# Patient Record
(demographics unavailable — no encounter records)

---

## 2024-10-24 NOTE — PHYSICAL EXAM
[FreeTextEntry1] : Mental status: Orientation: Alert , oriented to month, day of week, year, location Speech is fluent , able to name objects, repeat a sentence and write a sentence Memory: Short term tested by registering 3 objects and recalled 3/3 in 5 min; Long term memory intact based on correct recall of past events and demographic details. Concentration: able to do serial 7 calculation 5/5 and spell world backwards Comprehension : able follow 3 step requests Apraxia and visuospatial able to draw clock drawing and intersecting pentagon Neglect is absent Agnosia is absent  MMSE 30/30 Cranial nerves: CN I deferred. CN II VFF; ; III, IV, VI: PERRLA, EOM IV: Facial sensation normal B/L to touch, pinprick and temperature VII:Facial strength normal B/L. VIII: Gross hearing intact IX, X: palate midline and elevates symmetrically XI: Trapezius normal strength, XII: Tongue midline without atrophy or fasciculation Motor: Muscle tone no rigidity or resistance in all 4 extremities. No atrophy or fasciculation. Muscle strength: arms and legs, proximal and distal flexors and extensors are normal 5/5. No UE drift. Sensory: intact to pinprick, temperature sensation to vibration and cold.  Reflexes: all present, normal, and symmetrical 2+  Coordination: finger to nose: normal. Heel to shin: normal.  Mild impairment with right hand with repetitive finger movement  Gait: steady normal based, difficulty with tandem gait ; Romberg test negative

## 2024-10-24 NOTE — HISTORY OF PRESENT ILLNESS
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 Fung on ASA 81mg daily and Brilinta 60mg BID here for hospital follow-up. Initially presenting to [facility] on 10/5 for episode of imbalance and right sided weakness while in grocery store.  Reports started to feel her right side have "no control".  Symptoms resolved but sense of imbalance continued to come and go, she noticed later that day episode of slurring of speech which prompted her to come to the ER.   MRI brain was completed here with what the radiologist read as acute/subacute small anterior left temporal lobe infarction however both vascular neurologist and neurosurgeon reviewed MRI brain and suspected that finding was an old injury with T2 shine through.  In May 2024 after stent placed she became aphasic, CT head at that time revealing SAH within left sylvian fissure and indenting anterior aspect of left temporal lobe as well as ischemic infarct within the left temporal lobe.  During recent hospital stay EEG complete occasional left temporal slowing suggestive of focal dysfunction in this region.  Offered AED given cannot fully rule out seizure but pt refused.  Discharged home, while having dinner on same day with  experienced acute onset of R facial droop, R vision loss, and R arm/ leg weakness, and speech difficulty prompting return to hospital on 10/7.  Pt back to baseline upon arrival to hospital.  CT head without evidence of acute intracranial hemorrhage or large infarct. CTA with no large vessel occlusion, significant stenosis. Stable 3 mm residual at the base of the left ICA aneurysm, which is largely thrombosed. CTP without penumbra/core.  MRI showing left internal capsule new infarct compared to MRI on 10/5.  Increased ASA to 325mg based on subtherapeutic ARU and new stroke on MRI and recommended to continue brilinta 60mg BID upon discharge. No reoccurrence of symptoms. BP WNL, monitoring on daily basis, log readings ranging 114-128's/70-81. Diet whole foods, minimal processed, lean proteins such as chicken / seafood, does enjoy sweets - limits cheese, dairy, no red meat. Exercise previous marathon runner and hiking, sedentary lifestyle for the past year plans to re-establish exercise routine. Sleeping well, + snoring, no waking up with headaches, no fatigue, no dry mouth. Plans to have PT evaluation this week.  Visiting nurse once a week approved recently through [employer]. Follows with neuro ophthalmologist Dr Pennington for on-going left eye blurriness since radiation therapy - scheduled to follow-up.   Denies new weakness, numbness, sensation changes, visual disturbances, headaches, dizziness, recent fall or injury.  The remaining neurologic ROS is negative. Autoimmune specialist Dr Otilia Royal.

## 2024-10-24 NOTE — ASSESSMENT
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 Fung on ASA 81mg daily and Brilinta 60mg BID here for hospital follow-up.  Initially seen on 10/5 with balance instability and right sided weakness.  MRI brain without contrast - read as acute/subacute small anterior left temporal lobe infarction, suspect old injury with T2 shine through and d/c home. Returning to the ER on day of discharge with acute onset of right facial droop, right vision loss, speech disturbance and right arm/leg weakness. MRI showing small L IC lacunar infarct - new from prior MRI 10/5 which would cause R sided weakness. Lacunar infarct likely 2/2  from uncontrolled vascular risk factors.   -continue ASA 325mg (based on subtherapeutic ARU/ new stroke on MRI) and Brilinta 60mg BID -BP at goal <130/80 -A1C 6.0, currently on long term prednisone for SLE which can be affecting increase -, chol 209 - currently on atorvastatin 80mg - will recheck lipid panel in upcoming weeks to assure goal <70 -+ snoring - at home sleep test to r/o SABINE -cardiology referral for zio patch -on-going balance/ coordination difficulties - physical therapy evaluation scheduled for this week   Follow up in 6 weeks

## 2024-10-24 NOTE — HISTORY OF PRESENT ILLNESS
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 Fung on ASA 81mg daily and Brilinta 60mg BID here for hospital follow-up. Initially presenting to [facility] on 10/5 for episode of imbalance and right sided weakness while in grocery store.  Reports started to feel her right side have "no control".  Symptoms resolved but sense of imbalance continued to come and go, she noticed later that day episode of slurring of speech which prompted her to come to the ER.   MRI brain was completed here with what the radiologist read as acute/subacute small anterior left temporal lobe infarction however both vascular neurologist and neurosurgeon reviewed MRI brain and suspected that finding was an old injury with T2 shine through.  In May 2024 after stent placed she became aphasic, CT head at that time revealing SAH within left sylvian fissure and indenting anterior aspect of left temporal lobe as well as ischemic infarct within the left temporal lobe.  During recent hospital stay EEG complete occasional left temporal slowing suggestive of focal dysfunction in this region.  Offered AED given cannot fully rule out seizure but pt refused.  Discharged home, while having dinner on same day with  experienced acute onset of R facial droop, R vision loss, and R arm/ leg weakness, and speech difficulty prompting return to hospital on 10/7.  Pt back to baseline upon arrival to hospital.  CT head without evidence of acute intracranial hemorrhage or large infarct. CTA with no large vessel occlusion, significant stenosis. Stable 3 mm residual at the base of the left ICA aneurysm, which is largely thrombosed. CTP without penumbra/core.  MRI showing left internal capsule new infarct compared to MRI on 10/5.  Increased ASA to 325mg based on subtherapeutic ARU and new stroke on MRI and recommended to continue brilinta 60mg BID upon discharge. No reoccurrence of symptoms. BP WNL, monitoring on daily basis, log readings ranging 114-128's/70-81. Diet whole foods, minimal processed, lean proteins such as chicken / seafood, does enjoy sweets - limits cheese, dairy, no red meat. Exercise previous marathon runner and hiking, sedentary lifestyle for the past year plans to re-establish exercise routine. Sleeping well, + snoring, no waking up with headaches, no fatigue, no dry mouth. Plans to have PT evaluation this week.  Visiting nurse once a week approved recently through [employer]. Follows with neuro ophthalmologist Dr Pennington for on-going left eye blurriness since radiation therapy - scheduled to follow-up.   Denies new weakness, numbness, sensation changes, visual disturbances, headaches, dizziness, recent fall or injury.  The remaining neurologic ROS is negative. Autoimmune specialist Dr Otilia Royal.

## 2024-10-24 NOTE — DATA REVIEWED
Encouraged warm bathes/showers, Tylenol as needed. Heating pad as needed. Red flags and ER precautions reviewed. Please refer to the patient education material for further information and guidance. Discussed with pt if symptoms worsen or new symptoms develop to please seek further evaluation at the nearest Emergency Room.  Please follow up with your primary care provider in 3-5 days.        Patient Education     Constipation (Adult)  Constipation means that you have bowel movements that are less frequent than usual. Stools often become very hard and difficult to pass.  Constipation is very common. At some point in life it affects almost everyone. Since everyone's bowel habits are different, what is constipation to one person may not be to another. Your healthcare provider may do tests to diagnose constipation. It depends on what he or she finds when evaluating you.    Symptoms of constipation include:  · Abdominal pain  · Bloating  · Vomiting  · Painful bowel movements  · Itching, swelling, bleeding, or pain around the anus  Causes  Constipation can have many causes. These include:  · Diet low in fiber  · Too much dairy  · Not drinking enough liquids  · Lack of exercise or physical activity. This is especially true for older adults.  · Changes in lifestyle or daily routine, including pregnancy, aging, work, and travel  · Frequent use or misuse of laxatives  · Ignoring the urge to have a bowel movement or delaying it until later  · Medicines, such as certain prescription pain medicines, iron supplements, antacids, certain antidepressants, and calcium supplements  · Diseases like irritable bowel syndrome, bowel obstructions, stroke, diabetes, thyroid disease, Parkinson disease, hemorrhoids, and colon cancer  Complications  Potential complications of constipation can include:  · Hemorrhoids  · Rectal bleeding from hemorrhoids or anal fissures (skin tears)  · Hernias  · Dependency on laxatives  · Chronic  [de-identified] : 10/7/24- MRI Brain- FINDINGS: A small focus of diffusion restriction as well as T2/FLAIR hyperintense signal is seen within the left posterior limb of the internal capsule. There is no associated hemorrhage.  Small areas of encephalomalacia and gliosis are again seen along the left temporal cortex.  Multiple small rounded nonspecific T2/FLAIR hyperintense signal changes are noted throughout the bihemispheric white matter without associated mass effect or restricted diffusion.  There is no abnormal brain parenchymal or leptomeningeal enhancement.  Redemonstration of a stent within the left distal cavernous, clinoid and supraclinoid internal carotid arteries. A 3 mm aneurysm is again seen directly adjacent adjacent to the stent projecting medially towards the left suprasellar location. The majority of the aneurysm is again noted to be thrombosed.  Ventricular size and configuration is unremarkable. Flow-voids are noted throughout the major intracranial vessels, on the T2 weighted images, consistent with their patency. The sellar location appears unremarkable.  A polyp versus retention cyst is seen within the alveolar recess of the left maxillary sinus. The remainder of the paranasal sinuses and tympanomastoid cavities are clear. The calvarium appears intact. The orbits are unremarkable.   IMPRESSION: Evolving acute/subacute lacunar infarction within the posterior limb of the left internal capsule with associated cytotoxic edema.  No acute intracranial hemorrhage.  Chronic areas of encephalomalacia and gliosis along the left temporal cortex.  Unchanged partially thrombosed aneurysm off the left supraclinoid internal carotid artery adjacent to the stent.  10/5/24-MRI Brain/ Cervical Spine- FINDINGS: Chronic posterior left temporal lobe infarctions. There is a small area of abnormal restricted diffusion in the anterior left temporal lobe on image 40 of series 6 compatible with an acute/subacute infarction. Fusiform aneurysmal dilation to the left superior hypophyseal internal carotid artery measuring 5.4 mm in its diameter. Dedicated MRA of the brain is recommended.. Scattered periventricular and subcortical white matter T2 /FLAIR hyperintensities are seen without mass effect, nonspecific, likely representing mild chronic microvascular changes.  The lateral ventricles and cortical sulci are age-appropriate in size and configuration. There is no mass, mass effect, or extra-axial fluid collection. There is no susceptibility artifact to suggest hemorrhage. Midline structures are normal.  The visualized paranasal sinuses, mastoid air cells and orbits are unremarkable.   ==============  CLINICAL HISTORY: balance issues, r/o stroke  COMPARISON: None.  TECHNIQUE: Cervical spine MRI: Multiplanar, multisequence MR images of the cervical spine are obtained without administration of intravenous gadolinium.  FINDINGS: Moderate disc desiccation at the C5/C6 level. Unremarkable T1 marrow signal. No cord signal abnormality. No evidence of cord compression. No bone marrow edema. There is no evidence for acute fracture. A normal lordosis is noted. Craniocervical junction is normal. The cervicovertebral body heights and intervertebral disc spaces are preserved. There is no prevertebral soft tissue abnormality.    Evaluation of the individual levels: C2/C3 level: No spinal canal stenosis or foraminal narrowing. C3/C4 level: No spinal canal stenosis or foraminal narrowing. C4/C5 level: No spinal canal stenosis or foraminal narrowing. C5/C6 level: No spinal canal stenosis or foraminal narrowing. C6/C7 level: No spinal canal stenosis or foraminal narrowing. C7/T1 level:  No spinal canal stenosis or foraminal narrowing.   ==============    IMPRESSION:  MRI BRAIN: Acute/subacute small anterior left temporal lobe infarction. The left superior hypophyseal internal carotid artery measures 5.4 mm in its diameter. Dedicated MRA of the brain is recommended..   MRI CERVICAL SPINE: No evidence of cord compression or cord signal abnormality.  constipation  · Fecal impaction  · Bowel obstruction or perforation  Home care  All treatment should be done after talking with your healthcare provider. This is especially true if you have another medical problems, are taking prescription medicines, or are an older adult. Treatment most often involves lifestyle changes. You may also need medicines. Your healthcare provider will tell you which will work best for you. Follow the advice below to help avoid this problem in the future.  Lifestyle changes  These lifestyle changes can help prevent constipation:  · Diet. Eat a high-fiber diet, with fresh fruit and vegetables, and reduce dairy intake, meats, and processed foods  · Fluids. It's important to get enough fluids each day. Drink plenty of water when you eat more fiber. If you are on diet that limits the amount of fluid you can have, talk about this with your healthcare provider.  · Regular exercise. Check with your healthcare provider first.  Medicines  Take any medicines as directed. Some laxatives are safe to use only every now and then. Others can be taken on a regular basis. Talk with your doctor or pharmacist if you have questions.  Prescription pain medicines can cause constipation. If you are taking this kind of medicine, ask your healthcare provider if you should also take a stool softener.  Medicines you may take to treat constipation include:  · Fiber supplements  · Stool softeners  · Laxatives  · Enemas  · Rectal suppositories  Follow-up care  Follow up with your healthcare provider if symptoms don't get better in the next few days. You may need to have more tests or see a specialist.  Call 911  Call 911 if any of these occur:  · Trouble breathing  · Stiff, rigid abdomen that is severely painful to touch  · Confusion  · Fainting or loss of consciousness  · Rapid heart rate  · Chest pain  When to seek medical advice  Call your healthcare provider right away if any of these occur:  · Fever of 100.4°F  (38°C) or higher, or as directed by your healthcare provider  · Failure to resume normal bowel movements  · Pain in your abdomen or back gets worse  · Nausea or vomiting  · Swelling in your abdomen  · Blood in the stool  · Black, tarry stool  · Involuntary weight loss  · Weakness  Date Last Reviewed: 12/30/2015  © 1135-4702 [employer]. [address] [place] [id number]. All rights reserved. This information is not intended as a substitute for professional medical care. Always follow your healthcare professional's instructions.            [de-identified] : 10/6/24- occasional left temporal semi-rhythmic delta slowing suggestive of focal dysfunction in this region.  There were no findings of active epilepsy. [de-identified] : 10/7/24-CT Head, CTA Head/ Neck- FINDINGS:  There are multiple small chronic infarcts in the left temporal lobe, unchanged. There is a flow diverting stent in the left C5-C7 ICA segments, unchanged in position. There is a thrombosed aneurysm medial and superior to the stent, with a tiny amount of filling at the base of the aneurysm measuring 3 mm. This is stable compared to 10/5/2024, suggesting an incompletely excluded small residual at the base of the aneurysm. This could be characterized more definitively with catheter angiography.  Mild generalized cerebral volume loss. Mild nonspecific low attenuation in the periventricular and subcortical white matter.  No acute intracranial hemorrhage. No midline shift or herniation.   The visualized sinuses and mastoids are clear. Limited views of the orbits and visualized soft tissues of the neck, face, scalp, skull base, and calvarium are otherwise unremarkable.  Using a threshold of 30%, no rCBF defects are identified. Using a threshold of 6s, there are no Tmax abnormalities. This would be consistent with adequate cerebral blood flow without tissue at immediate risk. No evidence of an acute stroke within the limitations of technique. Small  infarcts or small emboli could be beyond the resolution of this exam, and MRI with DWI could be of value. No evidence of a perfusion mismatch. The time plot of the passage of the contrast bolus appears within normal limits, without significant artifacts detected in the arterial input function curve or during passage of venous contrast.  There is a common origin of the brachiocephalic artery and left CCA, a normal variant.  On the right, the ICA, ECA, CCA, and brachiocephalic artery are patent. On the left, the ICA, ECA, CCA are patent. The vertebrals and subclavian arteries are patent. No arterial dissection or ulcerated plaque.  There is excellent runoff in the left MCA distal to the stent. Visualization of the lumen inside the stent is somewhat limited due to beam hardening artifact associated with the hardware, but there is no gross evidence of in-stent stenosis.  No large vessel occlusions. No large feeding arteries or draining veins. The ACAs, MCAs, PCAs, and carotid siphons are otherwise negative. The basilar artery and V4 vertebral segments are otherwise negative. Abnormalities of  vessels and distal intracranial branches are beyond the resolution of this technique, and catheter angiography would be more sensitive.  The dural venous sinuses are grossly patent, although this examination wasn't optimized to evaluate the cerebral veins. Mild degenerative changes are noted in the cervical spine. No gross evidence of an acute fracture, although this examination wasn't optimized to evaluate the spine.   IMPRESSION:   1.  No acute intracranial hemorrhage. 2.  No retrievable clot or hemodynamically significant stenoses. 3.  MRI would be more sensitive for acute ischemia. 4.  Stable 3 mm residual at the base of the left ICA aneurysm, which is largely thrombosed.

## 2024-10-24 NOTE — DATA REVIEWED
[de-identified] : 10/7/24- MRI Brain- FINDINGS: A small focus of diffusion restriction as well as T2/FLAIR hyperintense signal is seen within the left posterior limb of the internal capsule. There is no associated hemorrhage.  Small areas of encephalomalacia and gliosis are again seen along the left temporal cortex.  Multiple small rounded nonspecific T2/FLAIR hyperintense signal changes are noted throughout the bihemispheric white matter without associated mass effect or restricted diffusion.  There is no abnormal brain parenchymal or leptomeningeal enhancement.  Redemonstration of a stent within the left distal cavernous, clinoid and supraclinoid internal carotid arteries. A 3 mm aneurysm is again seen directly adjacent adjacent to the stent projecting medially towards the left suprasellar location. The majority of the aneurysm is again noted to be thrombosed.  Ventricular size and configuration is unremarkable. Flow-voids are noted throughout the major intracranial vessels, on the T2 weighted images, consistent with their patency. The sellar location appears unremarkable.  A polyp versus retention cyst is seen within the alveolar recess of the left maxillary sinus. The remainder of the paranasal sinuses and tympanomastoid cavities are clear. The calvarium appears intact. The orbits are unremarkable.   IMPRESSION: Evolving acute/subacute lacunar infarction within the posterior limb of the left internal capsule with associated cytotoxic edema.  No acute intracranial hemorrhage.  Chronic areas of encephalomalacia and gliosis along the left temporal cortex.  Unchanged partially thrombosed aneurysm off the left supraclinoid internal carotid artery adjacent to the stent.  10/5/24-MRI Brain/ Cervical Spine- FINDINGS: Chronic posterior left temporal lobe infarctions. There is a small area of abnormal restricted diffusion in the anterior left temporal lobe on image 40 of series 6 compatible with an acute/subacute infarction. Fusiform aneurysmal dilation to the left superior hypophyseal internal carotid artery measuring 5.4 mm in its diameter. Dedicated MRA of the brain is recommended.. Scattered periventricular and subcortical white matter T2 /FLAIR hyperintensities are seen without mass effect, nonspecific, likely representing mild chronic microvascular changes.  The lateral ventricles and cortical sulci are age-appropriate in size and configuration. There is no mass, mass effect, or extra-axial fluid collection. There is no susceptibility artifact to suggest hemorrhage. Midline structures are normal.  The visualized paranasal sinuses, mastoid air cells and orbits are unremarkable.   ==============  CLINICAL HISTORY: balance issues, r/o stroke  COMPARISON: None.  TECHNIQUE: Cervical spine MRI: Multiplanar, multisequence MR images of the cervical spine are obtained without administration of intravenous gadolinium.  FINDINGS: Moderate disc desiccation at the C5/C6 level. Unremarkable T1 marrow signal. No cord signal abnormality. No evidence of cord compression. No bone marrow edema. There is no evidence for acute fracture. A normal lordosis is noted. Craniocervical junction is normal. The cervicovertebral body heights and intervertebral disc spaces are preserved. There is no prevertebral soft tissue abnormality.    Evaluation of the individual levels: C2/C3 level: No spinal canal stenosis or foraminal narrowing. C3/C4 level: No spinal canal stenosis or foraminal narrowing. C4/C5 level: No spinal canal stenosis or foraminal narrowing. C5/C6 level: No spinal canal stenosis or foraminal narrowing. C6/C7 level: No spinal canal stenosis or foraminal narrowing. C7/T1 level:  No spinal canal stenosis or foraminal narrowing.   ==============    IMPRESSION:  MRI BRAIN: Acute/subacute small anterior left temporal lobe infarction. The left superior hypophyseal internal carotid artery measures 5.4 mm in its diameter. Dedicated MRA of the brain is recommended..   MRI CERVICAL SPINE: No evidence of cord compression or cord signal abnormality.  [de-identified] : 10/6/24- occasional left temporal semi-rhythmic delta slowing suggestive of focal dysfunction in this region.  There were no findings of active epilepsy. [de-identified] : 10/7/24-CT Head, CTA Head/ Neck- FINDINGS:  There are multiple small chronic infarcts in the left temporal lobe, unchanged. There is a flow diverting stent in the left C5-C7 ICA segments, unchanged in position. There is a thrombosed aneurysm medial and superior to the stent, with a tiny amount of filling at the base of the aneurysm measuring 3 mm. This is stable compared to 10/5/2024, suggesting an incompletely excluded small residual at the base of the aneurysm. This could be characterized more definitively with catheter angiography.  Mild generalized cerebral volume loss. Mild nonspecific low attenuation in the periventricular and subcortical white matter.  No acute intracranial hemorrhage. No midline shift or herniation.   The visualized sinuses and mastoids are clear. Limited views of the orbits and visualized soft tissues of the neck, face, scalp, skull base, and calvarium are otherwise unremarkable.  Using a threshold of 30%, no rCBF defects are identified. Using a threshold of 6s, there are no Tmax abnormalities. This would be consistent with adequate cerebral blood flow without tissue at immediate risk. No evidence of an acute stroke within the limitations of technique. Small  infarcts or small emboli could be beyond the resolution of this exam, and MRI with DWI could be of value. No evidence of a perfusion mismatch. The time plot of the passage of the contrast bolus appears within normal limits, without significant artifacts detected in the arterial input function curve or during passage of venous contrast.  There is a common origin of the brachiocephalic artery and left CCA, a normal variant.  On the right, the ICA, ECA, CCA, and brachiocephalic artery are patent. On the left, the ICA, ECA, CCA are patent. The vertebrals and subclavian arteries are patent. No arterial dissection or ulcerated plaque.  There is excellent runoff in the left MCA distal to the stent. Visualization of the lumen inside the stent is somewhat limited due to beam hardening artifact associated with the hardware, but there is no gross evidence of in-stent stenosis.  No large vessel occlusions. No large feeding arteries or draining veins. The ACAs, MCAs, PCAs, and carotid siphons are otherwise negative. The basilar artery and V4 vertebral segments are otherwise negative. Abnormalities of  vessels and distal intracranial branches are beyond the resolution of this technique, and catheter angiography would be more sensitive.  The dural venous sinuses are grossly patent, although this examination wasn't optimized to evaluate the cerebral veins. Mild degenerative changes are noted in the cervical spine. No gross evidence of an acute fracture, although this examination wasn't optimized to evaluate the spine.   IMPRESSION:   1.  No acute intracranial hemorrhage. 2.  No retrievable clot or hemodynamically significant stenoses. 3.  MRI would be more sensitive for acute ischemia. 4.  Stable 3 mm residual at the base of the left ICA aneurysm, which is largely thrombosed.

## 2024-10-25 NOTE — REASON FOR VISIT
[FreeTextEntry1] : SAMMY JUNG is a 62 year female seen today in neurosurgery follow up. 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 Marlow, completed 7/12/24. Previous notes and interval history reviewed. She had presented to [facility] on 10/5/24 with balance issues, favoring the right side, for approximately 24 hours. MRI brain demonstrated no overt evidence of acute ischemic event, though there was suggestion of diffusion restriction in region of prior infarct. Evaluation by neurology with EEG demonstrated occasional left temporal slowing suggestive of focal dysfunction in this region. Her ASA and P2Y12 assays were noted to be therapeutic at that time on ASA 81 and Brillinta 60 mg BID dosing.  She was discharged on 10/7/24 in stable condition but had returned to the ER that evening after an episode of right facial weakness, right vision loss, and mild right arm and leg weakness. Her symptoms resolved and she returned to baseline en route via EMS.  MRI demonstrated punctate acute ischemic infarction in the posterior limb of the left internal capsule. CTA showed no large vessel occlusion or stenosis. Repeat ASA assay demonstrated subtherapeutic effect, while P2Y12 assay remained therapeutic. Her aspirin was increased to 325 mg daily as a result of this, and atorvastatin was increased to 80 mg daily following consultation with vascular neurology. She underwent a diagnostic cerebral angiogram on 10/9/24 which I independently reviewed and detailed below.  She has seen Shana Justin on 10/22/24 for longitudinal management regarding secondary stroke prevention.  Mrs. Jung has been doing relatively well since discharge on 10/11/24 and denies headaches, visual change, numbness, dizziness, neck pain, vertigo. She reports mild speech slurring with some words, though speech is fluent without word finding difficulties or paraphasic errors on examiantion. She has subjective residual right sided weakness which is not reflected in her neurological examination. She will be evaluted for physical therapy this week. She remains on  QD, Brilinta 60 mg BID and atorvastatin 80mg QD and has seen vascular neurology for longitudinal management regarding secondary stroke prevention.

## 2024-10-25 NOTE — ASSESSMENT
[FreeTextEntry1] : SAMMY JUNG is a 62 year female who presents status post hospital admission for new, punctate, acute ischemic infarction within the posterior limb of the left internal capsule. ASA assay was noted to be subtherapeutic and ASA was increased to 325mg po qd. Diagnostic cerebral angiogram was performed during the admission. I personally reviewed and interpreted the MRI brain with and without gadolinium from 10/7/24 which demonstrates an evolving acute/subacute lacunar infarction within the posterior limb of the left internal capsule with associated cytotoxic edema. I also independently reviewed the diagnostic cerebral angiogram from 10/9/24 which demonstrates short segment proximal occlusion of a branch of the left anterior choroidal artery with slower filling distally due to collateralization. This angiographic finding corresponds with patient's new MR infarct in the internal capsule. There has been interval significant reduction in size and delayed inflow of the treated left internal carotid artery ophthalmic segment aneurysm when compared to prior angiography.  The patient was presented at our system wide multidisciplinary cerebrovascular conference. Consensus cerebrovascular team recommendation was to continue the increase ASA dose of 325 QD, Brilinta 60 mg BID and atorvastatin 80mg QD. She will continue close follow up with vascular neurology for longitudinal management for secondary stroke prevention.    In addition, consensus cerebrovascular team recommendation is to obtain MRI brain and orbits with and without gadolinium to include a frameless protocol in 3 months with an appointment to follow. Barring new clinical or radiographic findings, the plan will be to maintain ASA and Brilinta at current dose and frequency with follow up diagnostic angiography to be performed in April 2025 to assess aneurysm treatment efficacy at that time. We have also advised her to continue directed follow up with Dr. Marlow for her treated optic nerve sheath meningioma.   I have asked the patient to contact me for any symptomatic development or progression in the interim at which time we can obtain expedited follow up imaging.   A total of 45 minutes was spent relative to this encounter.

## 2024-10-25 NOTE — DATA REVIEWED
[de-identified] : Exam: MRI BRAIN WAW  10/7/24 Order#: [id number]    .  CLINICAL INFORMATION: Right-sided weakness.  TECHNIQUE: Multiplanar multisequential MRI of the brain was acquired with and without the administration of IV gadolinium. 7 cc's of IV Gadavist was administered for the purposes of this examination. 0.5 cc were discarded.  COMPARISON: Prior CT code stroke series from earlier today. Prior brain MRI study dated 10/5/2024.  FINDINGS: A small focus of diffusion restriction as well as T2/FLAIR hyperintense signal is seen within the left posterior limb of the internal capsule. There is no associated hemorrhage.  Small areas of encephalomalacia and gliosis are again seen along the left temporal cortex.  Multiple small rounded nonspecific T2/FLAIR hyperintense signal changes are noted throughout the bihemispheric white matter without associated mass effect or restricted diffusion.  There is no abnormal brain parenchymal or leptomeningeal enhancement.  Redemonstration of a stent within the left distal cavernous, clinoid and supraclinoid internal carotid arteries. A 3 mm aneurysm is again seen directly adjacent adjacent to the stent projecting medially towards the left suprasellar location. The majority of the aneurysm is again noted to be thrombosed.  Ventricular size and configuration is unremarkable. Flow-voids are noted throughout the major intracranial vessels, on the T2 weighted images, consistent with their patency. The sellar location appears unremarkable.  A polyp versus retention cyst is seen within the alveolar recess of the left maxillary sinus. The remainder of the paranasal sinuses and tympanomastoid cavities are clear. The calvarium appears intact. The orbits are unremarkable.   IMPRESSION: Evolving acute/subacute lacunar infarction within the posterior limb of the left internal capsule with associated cytotoxic edema.  No acute intracranial hemorrhage.  Chronic areas of encephalomalacia and gliosis along the left temporal cortex.  Unchanged partially thrombosed aneurysm off the left supraclinoid internal carotid artery adjacent to the stent. [de-identified] : Exam: [facility] NEURO IR PROCEDURE 10/9/24 Order#: [id number]    DATE OF OPERATION: 10/9/2024  PREOPERATIVE DIAGNOSIS: Left ICA aneurysm status post pipeline flow diversion  POSTOPERATIVE DIAGNOSIS: Same, patent flow diverter and anterior choroidal occlusion  ANESTHESIA/SEDATION:  General Anesthesia was provided by the Department of Anesthesia throughout the procedure.  PROCEDURE: 1. Diagnostic cervicocerebral angiogram. 2. Ultrasound-guided arteriotomy, right radial artery.    PRIMARY RESPONSIBLE SURGEON: Lanre Hood MD  ASSISTANT SURGEON/PROCEDURALIST: ADRIA Augustin  INDICATIONS FOR PROCEDURE: 62 years Female with large left ICA aneurysm status post 2 pipeline flow diversion and interval new left internal capsule infarct. Diagnostic angiography is being performed for further evaluation of the patient's condition.  CONSENT: The patient and/or their family was given a full and complete explanation of the procedure including the risks of stroke, death, hemorrhage, intracranial hemorrhage, vascular injury which includes vessel perforation, vessel dissection, vessel occlusion, groin puncture site bleeding complications, allergic reaction to contrast material, femoral or radial nerve damage at the puncture site, hemorrhagic transformation of a stroke, thrombolic or embolic events, alopecia, blindness, visual field deficit, kidney damage, need for emergent surgery as well as other unforeseeable complications together with the potential benefits and alternatives. The patient and/or their family fully consented to undergo this procedure.  SPECIMENS TO PATHOLOGY: None.  HEPARIN: 2000 units in radial cocktail.  ENDOVASCULAR SURGERY PROCEDURE IN DETAIL AND FINDINGS:  The patient was brought to the neurointerventional operating room suite and placed supine on the angiography table. Arterial access sites were identified, prepped, and draped in the usual sterile fashion.  General Anesthesia was provided by the Department of Anesthesia throughout the procedure.  We administered local anesthesia with 10 ml of 1% lidocaine without epinephrine to the subcutaneous tissues of the arterial access site.  We used a 21 gauge micropuncture needle to puncture the right radial artery using ultrasound guidance to evaluate access sites, vessel patency, and real-time needle entry. Ultrasound images were stored in the patient's electronic chart.. We exchanged the needle over a guidewire for a 5 Cam[place]onian vascular access sheath. We secured the sheath in place. We then administered a radial access cocktail of verpamil 2.5 mg, nitroglycerin 100 mcg, and heparin 2500 U intra-arterially through the sheath.  We performed angiography by injecting iodinated contrast through the access sheath with the ipsilateral oblique projection. Acquired images demonstrate an expected appearance of the arteries without evidence of access-related complication.  Using fluoroscopic guidance, we advanced a 5 [place]onian catheter over a guidewire into the thoracic aorta.  Using fluoroscopic guidance, we selected the left common carotid artery (1st order) using standard neuroangiographic technique. We double flushed the catheter with heparinized saline solution. With the catheter remaining in this position, we obtained biplane digital subtraction angiography of the neck in multiple projections. Acquired images demonstrate patent cervical internal carotid artery without significant narrowing or dissection. External carotid arteries appear normal.  Using fluoroscopic guidance, we selected the right internal carotid artery (2nd order) using standard neuroangiographic technique. We double flushed the catheter with heparinized saline solution. With the catheter remaining in this position, we obtained biplane digital subtraction angiography of the head in multiple projections. 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 Marked reduction of filling of the aneurysm, with small ~4 mm residual along the proximal portion of a patent posterior communicating artery. As compared to the pre-operative angiography, the anterior choroidal artery fills in a much slower manner. High magnification oblique views show evidence of a short segment occlusion of the proximal anterior choroidal artery with collateral slower filling of the distal segments, corresponding with new infarct in the posterior limb of the internal capsule seen on the latest MRI.  3D rotational Victorina Head CT with dilute contrast injected through the left internal carotid artery redemonstrates the above findings and shows a pipeline device flared into the origin/ostium of the anterior choroidal artery. Redemonstrated short segment occlusion of the proximal anterior choroidal artery, as shown by saved 3D multiplanar reformats.  Using fluoroscopic guidance, we selected the right vertebral artery (2nd order) using standard neuroangiographic technique. We double flushed the catheter with heparinized saline solution. With the catheter remaining in this position, we obtained biplane digital subtraction angiography of the head in multiple projections. Acquired images demonstrate a normal vertebrobasilar system with interval upregulation of the left P1 segment. No evidence of aneurysm, arteriovenous shunting, or additional vascular abnormality.   All wires, catheters, and sheaths were removed from the patient's body. Hemostasis was achieved after removal of the sheath by TR Band.  No immediate iatrogenic complications resulted from this procedure. The patient tolerated the procedure well.  Due to the technical difficulty of performing this complex procedure, additional physicians were required.      IMPRESSION:  1. 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 Marked reduction of filling of the aneurysm, with small ~4 mm residual along the proximal portion of a patent posterior communicating artery. Short segment occlusion of the proximal anterior choroidal artery with collateral slower filling of the distal segments, corresponding with new infarct in the posterior limb of the internal capsule seen on the latest MRI.

## 2024-10-25 NOTE — REASON FOR VISIT
[FreeTextEntry1] : SAMMY JUNG is a 62 year female seen today in neurosurgery follow up. 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 Marlow, completed 7/12/24. Previous notes and interval history reviewed. She had presented to [facility] on 10/5/24 with balance issues, favoring the right side, for approximately 24 hours. MRI brain demonstrated no overt evidence of acute ischemic event, though there was suggestion of diffusion restriction in region of prior infarct. Evaluation by neurology with EEG demonstrated occasional left temporal slowing suggestive of focal dysfunction in this region. Her ASA and P2Y12 assays were noted to be therapeutic at that time on ASA 81 and Brillinta 60 mg BID dosing.  She was discharged on 10/7/24 in stable condition but had returned to the ER that evening after an episode of right facial weakness, right vision loss, and mild right arm and leg weakness. Her symptoms resolved and she returned to baseline en route via EMS.  MRI demonstrated punctate acute ischemic infarction in the posterior limb of the left internal capsule. CTA showed no large vessel occlusion or stenosis. Repeat ASA assay demonstrated subtherapeutic effect, while P2Y12 assay remained therapeutic. Her aspirin was increased to 325 mg daily as a result of this, and atorvastatin was increased to 80 mg daily following consultation with vascular neurology. She underwent a diagnostic cerebral angiogram on 10/9/24 which I independently reviewed and detailed below.  She has seen Shana Justin on 10/22/24 for longitudinal management regarding secondary stroke prevention.  Mrs. Jung has been doing relatively well since discharge on 10/11/24 and denies headaches, visual change, numbness, dizziness, neck pain, vertigo. She reports mild speech slurring with some words, though speech is fluent without word finding difficulties or paraphasic errors on examiantion. She has subjective residual right sided weakness which is not reflected in her neurological examination. She will be evaluted for physical therapy this week. She remains on  QD, Brilinta 60 mg BID and atorvastatin 80mg QD and has seen vascular neurology for longitudinal management regarding secondary stroke prevention.

## 2024-10-25 NOTE — DATA REVIEWED
[de-identified] : Exam: MRI BRAIN WAW  10/7/24 Order#: [id number]    .  CLINICAL INFORMATION: Right-sided weakness.  TECHNIQUE: Multiplanar multisequential MRI of the brain was acquired with and without the administration of IV gadolinium. 7 cc's of IV Gadavist was administered for the purposes of this examination. 0.5 cc were discarded.  COMPARISON: Prior CT code stroke series from earlier today. Prior brain MRI study dated 10/5/2024.  FINDINGS: A small focus of diffusion restriction as well as T2/FLAIR hyperintense signal is seen within the left posterior limb of the internal capsule. There is no associated hemorrhage.  Small areas of encephalomalacia and gliosis are again seen along the left temporal cortex.  Multiple small rounded nonspecific T2/FLAIR hyperintense signal changes are noted throughout the bihemispheric white matter without associated mass effect or restricted diffusion.  There is no abnormal brain parenchymal or leptomeningeal enhancement.  Redemonstration of a stent within the left distal cavernous, clinoid and supraclinoid internal carotid arteries. A 3 mm aneurysm is again seen directly adjacent adjacent to the stent projecting medially towards the left suprasellar location. The majority of the aneurysm is again noted to be thrombosed.  Ventricular size and configuration is unremarkable. Flow-voids are noted throughout the major intracranial vessels, on the T2 weighted images, consistent with their patency. The sellar location appears unremarkable.  A polyp versus retention cyst is seen within the alveolar recess of the left maxillary sinus. The remainder of the paranasal sinuses and tympanomastoid cavities are clear. The calvarium appears intact. The orbits are unremarkable.   IMPRESSION: Evolving acute/subacute lacunar infarction within the posterior limb of the left internal capsule with associated cytotoxic edema.  No acute intracranial hemorrhage.  Chronic areas of encephalomalacia and gliosis along the left temporal cortex.  Unchanged partially thrombosed aneurysm off the left supraclinoid internal carotid artery adjacent to the stent. 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  PROCEDURE: 1. Diagnostic cervicocerebral angiogram. 2. Ultrasound-guided arteriotomy, right radial artery.    PRIMARY RESPONSIBLE SURGEON: Lanre Hood MD  ASSISTANT SURGEON/PROCEDURALIST: ADRIA Augustin  INDICATIONS FOR PROCEDURE: 62 years Female with large left ICA aneurysm status post 2 pipeline flow diversion and interval new left internal capsule infarct. Diagnostic angiography is being performed for further evaluation of the patient's condition.  CONSENT: The patient and/or their family was given a full and complete explanation of the procedure including the risks of stroke, death, hemorrhage, intracranial hemorrhage, vascular injury which includes vessel perforation, vessel dissection, vessel occlusion, groin puncture site bleeding complications, allergic reaction to contrast material, femoral or radial nerve damage at the puncture site, hemorrhagic transformation of a stroke, thrombolic or embolic events, alopecia, blindness, visual field deficit, kidney damage, need for emergent surgery as well as other unforeseeable complications together with the potential benefits and alternatives. The patient and/or their family fully consented to undergo this procedure.  SPECIMENS TO PATHOLOGY: None.  HEPARIN: 2000 units in radial cocktail.  ENDOVASCULAR SURGERY PROCEDURE IN DETAIL AND FINDINGS:  The patient was brought to the neurointerventional operating room suite and placed supine on the angiography table. Arterial access sites were identified, prepped, and draped in the usual sterile fashion.  General Anesthesia was provided by the Department of Anesthesia throughout the procedure.  We administered local anesthesia with 10 ml of 1% lidocaine without epinephrine to the subcutaneous tissues of the arterial access site.  We used a 21 gauge micropuncture needle to puncture the right radial artery using ultrasound guidance to evaluate access sites, vessel patency, and real-time needle entry. Ultrasound images were stored in the patient's electronic chart.. We exchanged the needle over a guidewire for a 5 [place] vascular access sheath. We secured the sheath in place. We then administered a radial access cocktail of verpamil 2.5 mg, nitroglycerin 100 mcg, and heparin 2500 U intra-arterially through the sheath.  We performed angiography by injecting iodinated contrast through the access sheath with the ipsilateral oblique projection. Acquired images demonstrate an expected appearance of the arteries without evidence of access-related complication.  Using fluoroscopic guidance, we advanced a 5 [place] catheter over a guidewire into the thoracic aorta.  Using fluoroscopic guidance, we selected the left common carotid artery (1st order) using standard neuroangiographic technique. We double flushed the catheter with heparinized saline solution. With the catheter remaining in this position, we obtained biplane digital subtraction angiography of the neck in multiple projections. Acquired images demonstrate patent cervical internal carotid artery without significant narrowing or dissection. External carotid arteries appear normal.  Using fluoroscopic guidance, we selected the right internal carotid artery (2nd order) using standard neuroangiographic technique. We double flushed the catheter with heparinized saline solution. With the catheter remaining in this position, we obtained biplane digital subtraction angiography of the head in multiple projections. 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 Marked reduction of filling of the aneurysm, with small ~4 mm residual along the proximal portion of a patent posterior communicating artery. As compared to the pre-operative angiography, the anterior choroidal artery fills in a much slower manner. High magnification oblique views show evidence of a short segment occlusion of the proximal anterior choroidal artery with collateral slower filling of the distal segments, corresponding with new infarct in the posterior limb of the internal capsule seen on the latest MRI.  3D rotational Victorina Head CT with dilute contrast injected through the left internal carotid artery redemonstrates the above findings and shows a pipeline device flared into the origin/ostium of the anterior choroidal artery. Redemonstrated short segment occlusion of the proximal anterior choroidal artery, as shown by saved 3D multiplanar reformats.  Using fluoroscopic guidance, we selected the right vertebral artery (2nd order) using standard neuroangiographic technique. We double flushed the catheter with heparinized saline solution. With the catheter remaining in this position, we obtained biplane digital subtraction angiography of the head in multiple projections. Acquired images demonstrate a normal vertebrobasilar system with interval upregulation of the left P1 segment. No evidence of aneurysm, arteriovenous shunting, or additional vascular abnormality.   All wires, catheters, and sheaths were removed from the patient's body. Hemostasis was achieved after removal of the sheath by TR Band.  No immediate iatrogenic complications resulted from this procedure. The patient tolerated the procedure well.  Due to the technical difficulty of performing this complex procedure, additional physicians were required.      IMPRESSION:  1. 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 Marked reduction of filling of the aneurysm, with small ~4 mm residual along the proximal portion of a patent posterior communicating artery. Short segment occlusion of the proximal anterior choroidal artery with collateral slower filling of the distal segments, corresponding with new infarct in the posterior limb of the internal capsule seen on the latest MRI.

## 2024-10-25 NOTE — REASON FOR VISIT
[FreeTextEntry1] : SAMMY JUNG is a 62 year female seen today in neurosurgery follow up. 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 Marlow, completed 7/12/24. Previous notes and interval history reviewed. She had presented to [place] [facility] on 10/5/24 with balance issues, favoring the right side, for approximately 24 hours. MRI brain demonstrated no overt evidence of acute ischemic event, though there was suggestion of diffusion restriction in region of prior infarct. Evaluation by neurology with EEG demonstrated occasional left temporal slowing suggestive of focal dysfunction in this region. Her ASA and P2Y12 assays were noted to be therapeutic at that time on ASA 81 and Brillinta 60 mg BID dosing.  She was discharged on 10/7/24 in stable condition but had returned to the ER that evening after an episode of right facial weakness, right vision loss, and mild right arm and leg weakness. Her symptoms resolved and she returned to baseline en route via EMS.  MRI demonstrated punctate acute ischemic infarction in the posterior limb of the left internal capsule. CTA showed no large vessel occlusion or stenosis. Repeat ASA assay demonstrated subtherapeutic effect, while P2Y12 assay remained therapeutic. Her aspirin was increased to 325 mg daily as a result of this, and atorvastatin was increased to 80 mg daily following consultation with vascular neurology. She underwent a diagnostic cerebral angiogram on 10/9/24 which I independently reviewed and detailed below.  She has seen Shana Justin on 10/22/24 for longitudinal management regarding secondary stroke prevention.  Mrs. Jung has been doing relatively well since discharge on 10/11/24 and denies headaches, visual change, numbness, dizziness, neck pain, vertigo. She reports mild speech slurring with some words, though speech is fluent without word finding difficulties or paraphasic errors on examiantion. She has subjective residual right sided weakness which is not reflected in her neurological examination. She will be evaluted for physical therapy this week. She remains on  QD, Brilinta 60 mg BID and atorvastatin 80mg QD and has seen vascular neurology for longitudinal management regarding secondary stroke prevention.

## 2024-10-25 NOTE — DATA REVIEWED
[de-identified] : Exam: MRI BRAIN WAW  10/7/24 Order#: [id number]    .  CLINICAL INFORMATION: Right-sided weakness.  TECHNIQUE: Multiplanar multisequential MRI of the brain was acquired with and without the administration of IV gadolinium. 7 cc's of IV Gadavist was administered for the purposes of this examination. 0.5 cc were discarded.  COMPARISON: Prior CT code stroke series from earlier today. Prior brain MRI study dated 10/5/2024.  FINDINGS: A small focus of diffusion restriction as well as T2/FLAIR hyperintense signal is seen within the left posterior limb of the internal capsule. There is no associated hemorrhage.  Small areas of encephalomalacia and gliosis are again seen along the left temporal cortex.  Multiple small rounded nonspecific T2/FLAIR hyperintense signal changes are noted throughout the bihemispheric white matter without associated mass effect or restricted diffusion.  There is no abnormal brain parenchymal or leptomeningeal enhancement.  Redemonstration of a stent within the left distal cavernous, clinoid and supraclinoid internal carotid arteries. A 3 mm aneurysm is again seen directly adjacent adjacent to the stent projecting medially towards the left suprasellar location. The majority of the aneurysm is again noted to be thrombosed.  Ventricular size and configuration is unremarkable. Flow-voids are noted throughout the major intracranial vessels, on the T2 weighted images, consistent with their patency. The sellar location appears unremarkable.  A polyp versus retention cyst is seen within the alveolar recess of the left maxillary sinus. The remainder of the paranasal sinuses and tympanomastoid cavities are clear. The calvarium appears intact. The orbits are unremarkable.   IMPRESSION: Evolving acute/subacute lacunar infarction within the posterior limb of the left internal capsule with associated cytotoxic edema.  No acute intracranial hemorrhage.  Chronic areas of encephalomalacia and gliosis along the left temporal cortex.  Unchanged partially thrombosed aneurysm off the left supraclinoid internal carotid artery adjacent to the stent. [de-identified] : Exam: CAP NEURO IR PROCEDURE 10/9/24 Order#: [id number]    DATE OF OPERATION: 10/9/2024  PREOPERATIVE DIAGNOSIS: Left ICA aneurysm status post pipeline flow diversion  POSTOPERATIVE DIAGNOSIS: Same, patent flow diverter and anterior choroidal occlusion  ANESTHESIA/SEDATION:  General Anesthesia was provided by the Department of Anesthesia throughout the procedure.  PROCEDURE: 1. Diagnostic cervicocerebral angiogram. 2. Ultrasound-guided arteriotomy, right radial artery.    PRIMARY RESPONSIBLE SURGEON: Lanre Hood MD  ASSISTANT SURGEON/PROCEDURALIST: ADRIA Augustin  INDICATIONS FOR PROCEDURE: 62 years Female with large left ICA aneurysm status post 2 pipeline flow diversion and interval new left internal capsule infarct. Diagnostic angiography is being performed for further evaluation of the patient's condition.  CONSENT: The patient and/or their family was given a full and complete explanation of the procedure including the risks of stroke, death, hemorrhage, intracranial hemorrhage, vascular injury which includes vessel perforation, vessel dissection, vessel occlusion, groin puncture site bleeding complications, allergic reaction to contrast material, femoral or radial nerve damage at the puncture site, hemorrhagic transformation of a stroke, thrombolic or embolic events, alopecia, blindness, visual field deficit, kidney damage, need for emergent surgery as well as other unforeseeable complications together with the potential benefits and alternatives. The patient and/or their family fully consented to undergo this procedure.  SPECIMENS TO PATHOLOGY: None.  HEPARIN: 2000 units in radial cocktail.  ENDOVASCULAR SURGERY PROCEDURE IN DETAIL AND FINDINGS:  The patient was brought to the neurointerventional operating room suite and placed supine on the angiography table. Arterial access sites were identified, prepped, and draped in the usual sterile fashion.  General Anesthesia was provided by the Department of Anesthesia throughout the procedure.  We administered local anesthesia with 10 ml of 1% lidocaine without epinephrine to the subcutaneous tissues of the arterial access site.  We used a 21 gauge micropuncture needle to puncture the right radial artery using ultrasound guidance to evaluate access sites, vessel patency, and real-time needle entry. Ultrasound images were stored in the patient's electronic chart.. We exchanged the needle over a guidewire for a 5 [place] vascular access sheath. We secured the sheath in place. We then administered a radial access cocktail of verpamil 2.5 mg, nitroglycerin 100 mcg, and heparin 2500 U intra-arterially through the sheath.  We performed angiography by injecting iodinated contrast through the access sheath with the ipsilateral oblique projection. Acquired images demonstrate an expected appearance of the arteries without evidence of access-related complication.  Using fluoroscopic guidance, we advanced a 5 [place] catheter over a guidewire into the thoracic aorta.  Using fluoroscopic guidance, we selected the left common carotid artery (1st order) using standard neuroangiographic technique. We double flushed the catheter with heparinized saline solution. With the catheter remaining in this position, we obtained biplane digital subtraction angiography of the neck in multiple projections. Acquired images demonstrate patent cervical internal carotid artery without significant narrowing or dissection. External carotid arteries appear normal.  Using fluoroscopic guidance, we selected the right internal carotid artery (2nd order) using standard neuroangiographic technique. We double flushed the catheter with heparinized saline solution. With the catheter remaining in this position, we obtained biplane digital subtraction angiography of the head in multiple projections. 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 Marked reduction of filling of the aneurysm, with small ~4 mm residual along the proximal portion of a patent posterior communicating artery. As compared to the pre-operative angiography, the anterior choroidal artery fills in a much slower manner. High magnification oblique views show evidence of a short segment occlusion of the proximal anterior choroidal artery with collateral slower filling of the distal segments, corresponding with new infarct in the posterior limb of the internal capsule seen on the latest MRI.  3D rotational Victorina Head CT with dilute contrast injected through the left internal carotid artery redemonstrates the above findings and shows a pipeline device flared into the origin/ostium of the anterior choroidal artery. Redemonstrated short segment occlusion of the proximal anterior choroidal artery, as shown by saved 3D multiplanar reformats.  Using fluoroscopic guidance, we selected the right vertebral artery (2nd order) using standard neuroangiographic technique. We double flushed the catheter with heparinized saline solution. With the catheter remaining in this position, we obtained biplane digital subtraction angiography of the head in multiple projections. Acquired images demonstrate a normal vertebrobasilar system with interval upregulation of the left P1 segment. No evidence of aneurysm, arteriovenous shunting, or additional vascular abnormality.   All wires, catheters, and sheaths were removed from the patient's body. Hemostasis was achieved after removal of the sheath by TR Band.  No immediate iatrogenic complications resulted from this procedure. The patient tolerated the procedure well.  Due to the technical difficulty of performing this complex procedure, additional physicians were required.      IMPRESSION:  1. 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 Marked reduction of filling of the aneurysm, with small ~4 mm residual along the proximal portion of a patent posterior communicating artery. Short segment occlusion of the proximal anterior choroidal artery with collateral slower filling of the distal segments, corresponding with new infarct in the posterior limb of the internal capsule seen on the latest MRI.

## 2024-10-25 NOTE — DATA REVIEWED
[de-identified] : Exam: MRI BRAIN WAW  10/7/24 Order#: [id number]    .  CLINICAL INFORMATION: Right-sided weakness.  TECHNIQUE: Multiplanar multisequential MRI of the brain was acquired with and without the administration of IV gadolinium. 7 cc's of IV Gadavist was administered for the purposes of this examination. 0.5 cc were discarded.  COMPARISON: Prior CT code stroke series from earlier today. Prior brain MRI study dated 10/5/2024.  FINDINGS: A small focus of diffusion restriction as well as T2/FLAIR hyperintense signal is seen within the left posterior limb of the internal capsule. There is no associated hemorrhage.  Small areas of encephalomalacia and gliosis are again seen along the left temporal cortex.  Multiple small rounded nonspecific T2/FLAIR hyperintense signal changes are noted throughout the bihemispheric white matter without associated mass effect or restricted diffusion.  There is no abnormal brain parenchymal or leptomeningeal enhancement.  Redemonstration of a stent within the left distal cavernous, clinoid and supraclinoid internal carotid arteries. A 3 mm aneurysm is again seen directly adjacent adjacent to the stent projecting medially towards the left suprasellar location. The majority of the aneurysm is again noted to be thrombosed.  Ventricular size and configuration is unremarkable. Flow-voids are noted throughout the major intracranial vessels, on the T2 weighted images, consistent with their patency. The sellar location appears unremarkable.  A polyp versus retention cyst is seen within the alveolar recess of the left maxillary sinus. The remainder of the paranasal sinuses and tympanomastoid cavities are clear. The calvarium appears intact. The orbits are unremarkable.   IMPRESSION: Evolving acute/subacute lacunar infarction within the posterior limb of the left internal capsule with associated cytotoxic edema.  No acute intracranial hemorrhage.  Chronic areas of encephalomalacia and gliosis along the left temporal cortex.  Unchanged partially thrombosed aneurysm off the left supraclinoid internal carotid artery adjacent to the stent. [de-identified] : Exam: [facility] NEURO IR PROCEDURE 10/9/24 Order#: [id number]    DATE OF OPERATION: 10/9/2024  PREOPERATIVE DIAGNOSIS: Left ICA aneurysm status post pipeline flow diversion  POSTOPERATIVE DIAGNOSIS: Same, patent flow diverter and anterior choroidal occlusion  ANESTHESIA/SEDATION:  General Anesthesia was provided by the Department of Anesthesia throughout the procedure.  PROCEDURE: 1. Diagnostic cervicocerebral angiogram. 2. Ultrasound-guided arteriotomy, right radial artery.    PRIMARY RESPONSIBLE SURGEON: Lanre Hood MD  ASSISTANT SURGEON/PROCEDURALIST: ADRIA Augustin  INDICATIONS FOR PROCEDURE: 62 years Female with large left ICA aneurysm status post 2 pipeline flow diversion and interval new left internal capsule infarct. Diagnostic angiography is being performed for further evaluation of the patient's condition.  CONSENT: The patient and/or their family was given a full and complete explanation of the procedure including the risks of stroke, death, hemorrhage, intracranial hemorrhage, vascular injury which includes vessel perforation, vessel dissection, vessel occlusion, groin puncture site bleeding complications, allergic reaction to contrast material, femoral or radial nerve damage at the puncture site, hemorrhagic transformation of a stroke, thrombolic or embolic events, alopecia, blindness, visual field deficit, kidney damage, need for emergent surgery as well as other unforeseeable complications together with the potential benefits and alternatives. The patient and/or their family fully consented to undergo this procedure.  SPECIMENS TO PATHOLOGY: None.  HEPARIN: 2000 units in radial cocktail.  ENDOVASCULAR SURGERY PROCEDURE IN DETAIL AND FINDINGS:  The patient was brought to the neurointerventional operating room suite and placed supine on the angiography table. Arterial access sites were identified, prepped, and draped in the usual sterile fashion.  General Anesthesia was provided by the Department of Anesthesia throughout the procedure.  We administered local anesthesia with 10 ml of 1% lidocaine without epinephrine to the subcutaneous tissues of the arterial access site.  We used a 21 gauge micropuncture needle to puncture the right radial artery using ultrasound guidance to evaluate access sites, vessel patency, and real-time needle entry. Ultrasound images were stored in the patient's electronic chart.. We exchanged the needle over a guidewire for a 5 Mongolian vascular access sheath. We secured the sheath in place. We then administered a radial access cocktail of verpamil 2.5 mg, nitroglycerin 100 mcg, and heparin 2500 U intra-arterially through the sheath.  We performed angiography by injecting iodinated contrast through the access sheath with the ipsilateral oblique projection. Acquired images demonstrate an expected appearance of the arteries without evidence of access-related complication.  Using fluoroscopic guidance, we advanced a 5 Mongolian catheter over a guidewire into the thoracic aorta.  Using fluoroscopic guidance, we selected the left common carotid artery (1st order) using standard neuroangiographic technique. We double flushed the catheter with heparinized saline solution. With the catheter remaining in this position, we obtained biplane digital subtraction angiography of the neck in multiple projections. Acquired images demonstrate patent cervical internal carotid artery without significant narrowing or dissection. External carotid arteries appear normal.  Using fluoroscopic guidance, we selected the right internal carotid artery (2nd order) using standard neuroangiographic technique. We double flushed the catheter with heparinized saline solution. With the catheter remaining in this position, we obtained biplane digital subtraction angiography of the head in multiple projections. 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 Marked reduction of filling of the aneurysm, with small ~4 mm residual along the proximal portion of a patent posterior communicating artery. As compared to the pre-operative angiography, the anterior choroidal artery fills in a much slower manner. High magnification oblique views show evidence of a short segment occlusion of the proximal anterior choroidal artery with collateral slower filling of the distal segments, corresponding with new infarct in the posterior limb of the internal capsule seen on the latest MRI.  3D rotational Victorina Head CT with dilute contrast injected through the left internal carotid artery redemonstrates the above findings and shows a pipeline device flared into the origin/ostium of the anterior choroidal artery. Redemonstrated short segment occlusion of the proximal anterior choroidal artery, as shown by saved 3D multiplanar reformats.  Using fluoroscopic guidance, we selected the right vertebral artery (2nd order) using standard neuroangiographic technique. We double flushed the catheter with heparinized saline solution. With the catheter remaining in this position, we obtained biplane digital subtraction angiography of the head in multiple projections. Acquired images demonstrate a normal vertebrobasilar system with interval upregulation of the left P1 segment. No evidence of aneurysm, arteriovenous shunting, or additional vascular abnormality.   All wires, catheters, and sheaths were removed from the patient's body. Hemostasis was achieved after removal of the sheath by TR Band.  No immediate iatrogenic complications resulted from this procedure. The patient tolerated the procedure well.  Due to the technical difficulty of performing this complex procedure, additional physicians were required.      IMPRESSION:  1. 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 Marked reduction of filling of the aneurysm, with small ~4 mm residual along the proximal portion of a patent posterior communicating artery. Short segment occlusion of the proximal anterior choroidal artery with collateral slower filling of the distal segments, corresponding with new infarct in the posterior limb of the internal capsule seen on the latest MRI.

## 2024-10-25 NOTE — REASON FOR VISIT
[FreeTextEntry1] : SAMMY JUNG is a 62 year female seen today in neurosurgery follow up. 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 Marlow, completed 7/12/24. Previous notes and interval history reviewed. She had presented to [facility] on 10/5/24 with balance issues, favoring the right side, for approximately 24 hours. MRI brain demonstrated no overt evidence of acute ischemic event, though there was suggestion of diffusion restriction in region of prior infarct. Evaluation by neurology with EEG demonstrated occasional left temporal slowing suggestive of focal dysfunction in this region. Her ASA and P2Y12 assays were noted to be therapeutic at that time on ASA 81 and Brillinta 60 mg BID dosing.  She was discharged on 10/7/24 in stable condition but had returned to the ER that evening after an episode of right facial weakness, right vision loss, and mild right arm and leg weakness. Her symptoms resolved and she returned to baseline en route via EMS.  MRI demonstrated punctate acute ischemic infarction in the posterior limb of the left internal capsule. CTA showed no large vessel occlusion or stenosis. Repeat ASA assay demonstrated subtherapeutic effect, while P2Y12 assay remained therapeutic. Her aspirin was increased to 325 mg daily as a result of this, and atorvastatin was increased to 80 mg daily following consultation with vascular neurology. She underwent a diagnostic cerebral angiogram on 10/9/24 which I independently reviewed and detailed below.  She has seen Shana Justin on 10/22/24 for longitudinal management regarding secondary stroke prevention.  Mrs. Jung has been doing relatively well since discharge on 10/11/24 and denies headaches, visual change, numbness, dizziness, neck pain, vertigo. She reports mild speech slurring with some words, though speech is fluent without word finding difficulties or paraphasic errors on examiantion. She has subjective residual right sided weakness which is not reflected in her neurological examination. She will be evaluted for physical therapy this week. She remains on  QD, Brilinta 60 mg BID and atorvastatin 80mg QD and has seen vascular neurology for longitudinal management regarding secondary stroke prevention.

## 2024-11-15 NOTE — DISEASE MANAGEMENT
[Clinical] : TNM Stage: c [N/A] : Currently not applicable [TTNM] : x [NTNM] : x [MTNM] : x [de-identified] : 6665 [de-identified] : 3889 [de-identified] : meningioma

## 2024-11-15 NOTE — REVIEW OF SYSTEMS
[Fatigue: Grade 0] : Fatigue: Grade 0 [Dizziness: Grade 0] : Dizziness: Grade 0  [Headache: Grade 1 - Mild pain] : Headache: Grade 1 - Mild pain

## 2024-11-15 NOTE — PHYSICAL EXAM
[No Focal Deficits] : no focal deficits [Sensation] : the sensory exam was normal to light touch and pinprick [Motor Exam] : the motor exam was normal [Normal] : oriented to person, place and time, the affect was normal, the mood was normal and not anxious [de-identified] : Normal visual acuity and field of eyeball movement.  No other cranial nerve deficits noted.  No cerebellar signs

## 2024-11-15 NOTE — PHYSICAL EXAM
[No Focal Deficits] : no focal deficits [Sensation] : the sensory exam was normal to light touch and pinprick [Motor Exam] : the motor exam was normal [Normal] : oriented to person, place and time, the affect was normal, the mood was normal and not anxious [de-identified] : Normal visual acuity and field of eyeball movement.  No other cranial nerve deficits noted.  No cerebellar signs (4) no limitations

## 2024-11-15 NOTE — DISEASE MANAGEMENT
[Clinical] : TNM Stage: c [N/A] : Currently not applicable [TTNM] : x [NTNM] : x [MTNM] : x [de-identified] : 3290 [de-identified] : 0701 [de-identified] : meningioma

## 2024-11-15 NOTE — HISTORY OF PRESENT ILLNESS
[FreeTextEntry1] : Ms Gar is a 62 year-old female PMHx of Sjogren's disease, pre-diabetes who presented to Dr Pennington and [facility] with visual disturbances of the LEFT eye. 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  Describes cloudy vision in the inferior visual field of her left eye. Associated blurry vision upon awakening, which improves throughout the day. Associated photosensitivity as well. Reports significant change in her eyeglasses prescription this year as well, notable for her left vision worse than her right. She has followed with an ophthalmologist for several years. Underwent routine evaluation in March 2024, demonstrating edema of the LEFT optic disc. She was subsequently referred to Dr. Pennington for neuro-ophthalmology evaluation. Dr. Pennington' evaluation was notable for left eye disc edema and an enlarged physiologic blind spot suggestive for extrinsic optic nerve compression.  MRI brain and orbits performed at [facility] 4/30/24 demonstrating a suspected meningioma of the left optic nerve sheath as well as a possible 1.1cm aneurysm of the left supra clinoid internal carotid artery. Findings: Brain  Cerebral ventricles are normal in size and position. Few small T2/flair signal hyperintensities in the supratentorial white matter suggesting minimal age-appropriate chronic microvascular ischemic changes. Diffusion-weighted images demonstrate no evidence of acute infarction. There is no evidence of intracranial hemorrhage, midline shift or mass effect. Postcontrast images demonstrate no enhancing lesions.  Signal void in the left side of the suprasellar cistern measures approximately 1.1 cm and suspicious for large supraclinoid ICA aneurysm. It abuts the left side of the optic chiasm.  There is no evidence of significant sinusitis. Small left maxillary sinus retention cyst versus polyp.  MRI ORBITS IMPRESSION: Diffuse left optic nerve sheath thickening and enhancement and nodular enhancement in the left amie-clinoid region adjacent to the intracranial optic nerve, suspicious for large optic nerve sheath meningioma. Dr. Pennington notified by Tiger text. Optic perineuritis considered less likely. Optic nerve glioma unlikely as the lesion does not appear to originate from the nerve. However the possibility of left optic nerve edema cannot be excluded.  Angiogram done 5.15.24 showed [id number] multilobulated dysplastic wide necked left Opthalmic internal carotid artery aneurysm incorporating origin of aberrant proximal left fetal posterior cerebral artery 2.5x3mm left Opthalmic segment carotid aneurysm arising just distal to the larger more proximal aneurysm.  5/16/24 Dr Fung has referred her here to speak about evaluation and possible treatment with stereotactic fractionated radiotherapy.  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 She returns today for her initial post treatment evaluation. Patient denies any headache, dizziness, or gait imbalance. She does report of occasional anomic aphasia and stable left eye blurriness. Patient is currently experiencing Sjogren's flare with skin rash, irritation, and blotches for which she has been using Amlactin; managed by Dr. Good (rheumatologist).  11/15/24 Six month follow up Ms Gar had a MRI at [facility] on 10/29/24 which showed: Left optic nerve sheath meningioma not significantly changed compared to prior orbital MRI on 4/30/24.      She had presented to [facility] on 10/5/24 with balance issues, favoring the right side, for approximately 24 hours. MRI brain demonstrated no overt evidence of acute ischemic event, though there was suggestion of diffusion restriction in region of prior infarct. Evaluation by neurology with EEG demonstrated occasional left temporal slowing suggestive of focal dysfunction in this region. Her ASA and P2Y12 assays were noted to be therapeutic at that time on ASA 81 and Brillinta 60 mg BID dosing. She was discharged on 10/7/24 in stable condition but had returned to the ER that evening after an episode of right facial weakness, right vision loss, and mild right arm and leg weakness. Her symptoms resolved and she returned to baseline en route via EMS. MRI demonstrated punctate acute ischemic infarction in the posterior limb of the left internal capsule. CTA showed no large vessel occlusion or stenosis. Repeat ASA assay demonstrated subtherapeutic effect, while P2Y12 assay remained therapeutic. Her aspirin was increased to 325 mg daily as a result of this, and atorvastatin was increased to 80 mg daily following consultation with vascular neurology. She underwent a diagnostic cerebral angiogram on 10/9/24 .IMPRESSION:  1. 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 Marked reduction of filling of the aneurysm, with small ~4 mm residual along the proximal portion of a patent posterior communicating artery. Short segment occlusion of the proximal anterior choroidal artery with collateral slower filling of the distal segments, corresponding with new infarct in the posterior limb of the internal capsule seen on the latest MRI.    She has seen Shana Justin on 10/22/24 for longitudinal management regarding secondary stroke prevention. She saw Dr Fung on 10/25/24 He will increase ASA dose to 235mg brilinta 60mg bid and atorvastatin 80mg QD. diagnostic angiography to be performed in April 2025 to assess aneurysm treatment efficacy at that time.  Ms. Gar states that she is overall feeling well. She denies any pain. She does get the occasional headaches, but an aspirin does help. She still has some cloudy vision in the morning. She believes that her speech is about the same.

## 2024-11-15 NOTE — REASON FOR VISIT
[Post-Treatment Evaluation] : post-treatment evaluation for [Brain Tumor] : brain tumor [Routine Follow-Up] : routine follow-up visit for

## 2024-11-15 NOTE — DISEASE MANAGEMENT
[Clinical] : TNM Stage: c [N/A] : Currently not applicable [TTNM] : x [NTNM] : x [MTNM] : x [de-identified] : 8299 [de-identified] : 6657 [de-identified] : meningioma

## 2024-11-15 NOTE — PHYSICAL EXAM
[No Focal Deficits] : no focal deficits [Sensation] : the sensory exam was normal to light touch and pinprick [Motor Exam] : the motor exam was normal [Normal] : oriented to person, place and time, the affect was normal, the mood was normal and not anxious [de-identified] : Normal visual acuity and field of eyeball movement.  No other cranial nerve deficits noted.  No cerebellar signs

## 2024-11-15 NOTE — HISTORY OF PRESENT ILLNESS
[FreeTextEntry1] : Ms Gar is a 62 year-old female PMHx of Sjogren's disease, pre-diabetes who presented to Dr Pennington and [facility] with visual disturbances of the LEFT eye. 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  Describes cloudy vision in the inferior visual field of her left eye. Associated blurry vision upon awakening, which improves throughout the day. Associated photosensitivity as well. Reports significant change in her eyeglasses prescription this year as well, notable for her left vision worse than her right. She has followed with an ophthalmologist for several years. Underwent routine evaluation in March 2024, demonstrating edema of the LEFT optic disc. She was subsequently referred to Dr. Pennington for neuro-ophthalmology evaluation. Dr. Pennington' evaluation was notable for left eye disc edema and an enlarged physiologic blind spot suggestive for extrinsic optic nerve compression.  MRI brain and orbits performed at [facility] 4/30/24 demonstrating a suspected meningioma of the left optic nerve sheath as well as a possible 1.1cm aneurysm of the left supra clinoid internal carotid artery. Findings: Brain  Cerebral ventricles are normal in size and position. Few small T2/flair signal hyperintensities in the supratentorial white matter suggesting minimal age-appropriate chronic microvascular ischemic changes. Diffusion-weighted images demonstrate no evidence of acute infarction. There is no evidence of intracranial hemorrhage, midline shift or mass effect. Postcontrast images demonstrate no enhancing lesions.  Signal void in the left side of the suprasellar cistern measures approximately 1.1 cm and suspicious for large supraclinoid ICA aneurysm. It abuts the left side of the optic chiasm.  There is no evidence of significant sinusitis. Small left maxillary sinus retention cyst versus polyp.  MRI ORBITS IMPRESSION: Diffuse left optic nerve sheath thickening and enhancement and nodular enhancement in the left amie-clinoid region adjacent to the intracranial optic nerve, suspicious for large optic nerve sheath meningioma. Dr. Pennington notified by Tiger text. Optic perineuritis considered less likely. Optic nerve glioma unlikely as the lesion does not appear to originate from the nerve. However the possibility of left optic nerve edema cannot be excluded.  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  5/16/24 Dr Fung has referred her here to speak about evaluation and possible treatment with stereotactic fractionated radiotherapy.  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 She returns today for her initial post treatment evaluation. Patient denies any headache, dizziness, or gait imbalance. She does report of occasional anomic aphasia and stable left eye blurriness. Patient is currently experiencing Sjogren's flare with skin rash, irritation, and blotches for which she has been using Amlactin; managed by Dr. Good (rheumatologist).  11/15/24 Six month follow up Ms Gar had a MRI at [facility] on 10/29/24 which showed: Left optic nerve sheath meningioma not significantly changed compared to prior orbital MRI on 4/30/24.      She had presented to [place] ER on 10/5/24 with balance issues, favoring the right side, for approximately 24 hours. MRI brain demonstrated no overt evidence of acute ischemic event, though there was suggestion of diffusion restriction in region of prior infarct. Evaluation by neurology with EEG demonstrated occasional left temporal slowing suggestive of focal dysfunction in this region. Her ASA and P2Y12 assays were noted to be therapeutic at that time on ASA 81 and Brillinta 60 mg BID dosing. She was discharged on 10/7/24 in stable condition but had returned to the ER that evening after an episode of right facial weakness, right vision loss, and mild right arm and leg weakness. Her symptoms resolved and she returned to baseline en route via EMS. MRI demonstrated punctate acute ischemic infarction in the posterior limb of the left internal capsule. CTA showed no large vessel occlusion or stenosis. Repeat ASA assay demonstrated subtherapeutic effect, while P2Y12 assay remained therapeutic. Her aspirin was increased to 325 mg daily as a result of this, and atorvastatin was increased to 80 mg daily following consultation with vascular neurology. She underwent a diagnostic cerebral angiogram on 10/9/24 .IMPRESSION:  1. 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 Marked reduction of filling of the aneurysm, with small ~4 mm residual along the proximal portion of a patent posterior communicating artery. Short segment occlusion of the proximal anterior choroidal artery with collateral slower filling of the distal segments, corresponding with new infarct in the posterior limb of the internal capsule seen on the latest MRI.    She has seen Shana Justin on 10/22/24 for longitudinal management regarding secondary stroke prevention. She saw Dr Fung on 10/25/24 He will increase ASA dose to 235mg brilinta 60mg bid and atorvastatin 80mg QD. diagnostic angiography to be performed in April 2025 to assess aneurysm treatment efficacy at that time.  Ms. Gar states that she is overall feeling well. She denies any pain. She does get the occasional headaches, but an aspirin does help. She still has some cloudy vision in the morning. She believes that her speech is about the same.

## 2024-11-15 NOTE — HISTORY OF PRESENT ILLNESS
[FreeTextEntry1] : Ms Gar is a 62 year-old female PMHx of Sjogren's disease, pre-diabetes who presented to Dr Pennington and [facility] with visual disturbances of the LEFT eye. 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  Describes cloudy vision in the inferior visual field of her left eye. Associated blurry vision upon awakening, which improves throughout the day. Associated photosensitivity as well. Reports significant change in her eyeglasses prescription this year as well, notable for her left vision worse than her right. She has followed with an ophthalmologist for several years. Underwent routine evaluation in March 2024, demonstrating edema of the LEFT optic disc. She was subsequently referred to Dr. Pennington for neuro-ophthalmology evaluation. Dr. Pennington' evaluation was notable for left eye disc edema and an enlarged physiologic blind spot suggestive for extrinsic optic nerve compression.  MRI brain and orbits performed at [facility] 4/30/24 demonstrating a suspected meningioma of the left optic nerve sheath as well as a possible 1.1cm aneurysm of the left supra clinoid internal carotid artery. Findings: Brain  Cerebral ventricles are normal in size and position. Few small T2/flair signal hyperintensities in the supratentorial white matter suggesting minimal age-appropriate chronic microvascular ischemic changes. Diffusion-weighted images demonstrate no evidence of acute infarction. There is no evidence of intracranial hemorrhage, midline shift or mass effect. Postcontrast images demonstrate no enhancing lesions.  Signal void in the left side of the suprasellar cistern measures approximately 1.1 cm and suspicious for large supraclinoid ICA aneurysm. It abuts the left side of the optic chiasm.  There is no evidence of significant sinusitis. Small left maxillary sinus retention cyst versus polyp.  MRI ORBITS IMPRESSION: Diffuse left optic nerve sheath thickening and enhancement and nodular enhancement in the left amie-clinoid region adjacent to the intracranial optic nerve, suspicious for large optic nerve sheath meningioma. Dr. Pennington notified by Tiger text. Optic perineuritis considered less likely. Optic nerve glioma unlikely as the lesion does not appear to originate from the nerve. However the possibility of left optic nerve edema cannot be excluded.  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  5/16/24 Dr Fung has referred her here to speak about evaluation and possible treatment with stereotactic fractionated radiotherapy.  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 She returns today for her initial post treatment evaluation. Patient denies any headache, dizziness, or gait imbalance. She does report of occasional anomic aphasia and stable left eye blurriness. Patient is currently experiencing Sjogren's flare with skin rash, irritation, and blotches for which she has been using Amlactin; managed by Dr. Good (rheumatologist).  11/15/24 Six month follow up Ms Gar had a MRI at [facility] on 10/29/24 which showed: Left optic nerve sheath meningioma not significantly changed compared to prior orbital MRI on 4/30/24.      She had presented to [facility] ER on 10/5/24 with balance issues, favoring the right side, for approximately 24 hours. MRI brain demonstrated no overt evidence of acute ischemic event, though there was suggestion of diffusion restriction in region of prior infarct. Evaluation by neurology with EEG demonstrated occasional left temporal slowing suggestive of focal dysfunction in this region. Her ASA and P2Y12 assays were noted to be therapeutic at that time on ASA 81 and Brillinta 60 mg BID dosing. She was discharged on 10/7/24 in stable condition but had returned to the ER that evening after an episode of right facial weakness, right vision loss, and mild right arm and leg weakness. Her symptoms resolved and she returned to baseline en route via EMS. MRI demonstrated punctate acute ischemic infarction in the posterior limb of the left internal capsule. CTA showed no large vessel occlusion or stenosis. Repeat ASA assay demonstrated subtherapeutic effect, while P2Y12 assay remained therapeutic. Her aspirin was increased to 325 mg daily as a result of this, and atorvastatin was increased to 80 mg daily following consultation with vascular neurology. She underwent a diagnostic cerebral angiogram on 10/9/24 .IMPRESSION:  1. 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 Marked reduction of filling of the aneurysm, with small ~4 mm residual along the proximal portion of a patent posterior communicating artery. Short segment occlusion of the proximal anterior choroidal artery with collateral slower filling of the distal segments, corresponding with new infarct in the posterior limb of the internal capsule seen on the latest MRI.    She has seen Shana Justin on 10/22/24 for longitudinal management regarding secondary stroke prevention. She saw Dr Fung on 10/25/24 He will increase ASA dose to 235mg brilinta 60mg bid and atorvastatin 80mg QD. diagnostic angiography to be performed in April 2025 to assess aneurysm treatment efficacy at that time.  Ms. Gar states that she is overall feeling well. She denies any pain. She does get the occasional headaches, but an aspirin does help. She still has some cloudy vision in the morning. She believes that her speech is about the same.

## 2024-11-28 NOTE — DISCUSSION/SUMMARY
[FreeTextEntry1] :  Preoperative cardiovascular examination  Marlene carries a diagnosis of systemic lupus erythematosus and hyperlipidemia.  She previously had radiation therapy for a meningioma of the left optic nerve sheath in 7/24.  In 5/24 she underwent stent deployment for an internal carotid artery aneurysm which is complicated by a subarachnoid hemorrhage and left temporal lobe ischemic infarct.  Since that time she has had recurrent episodes of focal neurological deficits.  Despite antiplatelet therapy.  There is no history of atrial fibrillation. An evaluation to detect the presence of atrial fibrillation with a mobile telemetry study could not be adequately performed due to a skin reaction to the device.  .  She was evaluated by Dr. Doyle/ cardiology who recommended a cardiac loop recorder  Informed consent was obtained from the patient.  The risks including but not limited to bleeding infection dislodgment and requirements for reoperation/repositioning of the device were all explained.  All questions were answered.    I have recommended the following Implantable cardiac loop recorder 12/19/2024

## 2024-11-28 NOTE — CARDIOLOGY SUMMARY
[de-identified] : 11/27/24 ECG: Sinus bradycardia, rate 55 bpm 10/7/24 ECG (at [facility]): Sinus rhythm, rate 70 bpm [de-identified] : 10/6/24 Echo (at [facility]): Normal LV systolic function, LVEF 58%. Mild LVH. Normal RV size and systolic function. Trace MR/TR. Negative bubble study.

## 2024-11-28 NOTE — PHYSICAL EXAM
[Normal Conjunctiva] : normal conjunctiva [Normal S1, S2] : normal S1, S2 [No Edema] : no edema [No Rash] : no rash [No Focal Deficits] : no focal deficits [de-identified] : Appears in no distress lying flat [de-identified] : no neck vein distention [de-identified] : pleasant

## 2024-11-28 NOTE — HISTORY OF PRESENT ILLNESS
[FreeTextEntry1] : 61 yo female with SLE, Sjogren's, hypothyroidism, meningioma of left optic nerve sheath -> radiation therapy 7/2024, endovascular flow diverter stent (x2) embolization of an approximately 1.5c m x 1 cm multilobulated, dysplastic, wide-necked left ophthalmic segment internal carotid artery aneurysm on 5/24/24. Patient presents today after [facility] hospitalization on 10/5/24 for acute CVA characterized by right sided weakness, right facial droop, right vision loss, and speech disturbance. Brain on 10/5/24 reported acute/subacute small anterior left temporal lobe infarction, but was suspected to be an old injury with T2 shine through. Brain MRI on 10/7/24 reported evolving acute/subacute lacunar infarction within the posterior limb of the left internal capsule with associated cytotoxic edema. Patient was discharged on aspirin 325 mg po daily and Brilinta 60 mg po bid. Patient presents today for cardiac evaluation with cardiac monitor following her recent CVA.

## 2024-11-28 NOTE — ASSESSMENT
[FreeTextEntry1] : 61 yo female with SLE, Sjogren's, hypothyroidism, meningioma of left optic nerve sheath -> radiation therapy 7/2024, endovascular flow diverter stent (x2) embolization of an approximately 1.5c m x 1 cm multilobulated, dysplastic, wide-necked left ophthalmic segment internal carotid artery aneurysm on 5/24/24. Patient with acute/subacute lacunar infarction within the posterior limb of the left internal capsule per 10/7/24 brain MRI. ECG today demonstrated sinus bradycardia, rate 55 bpm.  Given CVA and inability to wear long term cardiac monitor due to extreme skin irritation/allergic reaction to adhesive from just ECG leads, will refer patient for implantable loop recorder to evaluate for atrial fibrillation/flutter. Will continue aspirin 325 mg po daily, Brilinta 60 mg po bid, and atorvastatin at this time pending hypercoagulable labs drawn earlier today at [facility].

## 2024-11-28 NOTE — CARDIOLOGY SUMMARY
[de-identified] : 11/27/24 ECG: Sinus bradycardia, rate 55 bpm 10/7/24 ECG (at [facility]): Sinus rhythm, rate 70 bpm [de-identified] : 10/6/24 Echo (at [place]): Normal LV systolic function, LVEF 58%. Mild LVH. Normal RV size and systolic function. Trace MR/TR. Negative bubble study.

## 2024-11-28 NOTE — HISTORY OF PRESENT ILLNESS
[FreeTextEntry1] : 62-year-old female Preoperative cardiovascular examination.    Marlene carries a diagnosis of systemic lupus erythematosus and hyperlipidemia.  She previously had radiation therapy for a meningioma of the left optic nerve sheath in 7/24.  In 5/24 she underwent stent deployment for an internal carotid artery aneurysm which is complicated by a subarachnoid hemorrhage and left temporal lobe ischemic infarct.  Since that time she has had recurrent episodes of focal neurological deficits.  Despite antiplatelet therapy.  There is no history of atrial fibrillation. An evaluation to detect the presence of atrial fibrillation with a mobile telemetry study could not be adequately performed due to a skin reaction to the device.  .  She was evaluated by Dr. Doyle/ cardiology who recommended a cardiac loop recorder

## 2024-11-28 NOTE — REVIEW OF SYSTEMS
[Feeling Fatigued] : feeling fatigued [FreeTextEntry3] : glasses [FreeTextEntry5] : see history of present illness

## 2024-11-28 NOTE — HISTORY OF PRESENT ILLNESS
[FreeTextEntry1] : 63 yo female with SLE, Sjogren's, hypothyroidism, meningioma of left optic nerve sheath -> radiation therapy 7/2024, endovascular flow diverter stent (x2) embolization of an approximately 1.5c m x 1 cm multilobulated, dysplastic, wide-necked left ophthalmic segment internal carotid artery aneurysm on 5/24/24. Patient presents today after [facility] hospitalization on 10/5/24 for acute CVA characterized by right sided weakness, right facial droop, right vision loss, and speech disturbance. Brain on 10/5/24 reported acute/subacute small anterior left temporal lobe infarction, but was suspected to be an old injury with T2 shine through. Brain MRI on 10/7/24 reported evolving acute/subacute lacunar infarction within the posterior limb of the left internal capsule with associated cytotoxic edema. Patient was discharged on aspirin 325 mg po daily and Brilinta 60 mg po bid. Patient presents today for cardiac evaluation with cardiac monitor following her recent CVA.

## 2024-11-28 NOTE — PHYSICAL EXAM
[Normal Conjunctiva] : normal conjunctiva [Normal S1, S2] : normal S1, S2 [No Edema] : no edema [No Rash] : no rash [No Focal Deficits] : no focal deficits [de-identified] : no neck vein distention [de-identified] : Appears in no distress lying flat [de-identified] : pleasant

## 2024-11-28 NOTE — ASSESSMENT
[FreeTextEntry1] : 63 yo female with SLE, Sjogren's, hypothyroidism, meningioma of left optic nerve sheath -> radiation therapy 7/2024, endovascular flow diverter stent (x2) embolization of an approximately 1.5c m x 1 cm multilobulated, dysplastic, wide-necked left ophthalmic segment internal carotid artery aneurysm on 5/24/24. Patient with acute/subacute lacunar infarction within the posterior limb of the left internal capsule per 10/7/24 brain MRI. ECG today demonstrated sinus bradycardia, rate 55 bpm.  Given CVA and inability to wear long term cardiac monitor due to extreme skin irritation/allergic reaction to adhesive from just ECG leads, will refer patient for implantable loop recorder to evaluate for atrial fibrillation/flutter. Will continue aspirin 325 mg po daily, Brilinta 60 mg po bid, and atorvastatin at this time pending hypercoagulable labs drawn earlier today at [place].

## 2024-12-26 NOTE — ASSESSMENT
[FreeTextEntry1] : 63-y/o F, currently on Saphnelo infusions for her Lupus every month. Presents today for consultation for abnormalities in her hypercoagulable workup. Patient's LUZ, AT III, and protein C activity were elevated on her most recent blood work from 11/27/24.  Patient reports having 4 strokes this year.  Has on brelinta and ASA prior to surgery.  Had 2 strokes in May 24th and May 29, 2024 after surgery (stent in L carotid A aneurysm in 5/24/24)).  Could not speak after surgery on 5/24/24, stayed in ICU for 3 days.  On 5/29/24. had reduced vision, lasted for a day--didn't go to hospital but imaging on 6/4/24--is not sure if had another stroke.  Was on brelinta and ASA 81 mg/day, In 10/4/24, R side weakness--went to [facility], admitted for 2 days; could not discern if it was acute or old stroke.  On 10/7/24, had R sided weakness, went to hospital again; d/c'd on 10/11/24.  Was told to continue brelinta and ASA.    Patient reports having a bad Lupus flare around August 26, 2024. Has been on prednisone since then; was initially on 40mg but now down to 10mg. Reports symptoms with her autoimmune disease has been worse since she received the COVID vaccine, got 2 doses of Moderna vaccine.  Started saphnelo infusion in 9/18/24.  She also reports having elevated liver enzymes recently. Went for ABD US today.    Plan: -Patient is getting a loop recorder placed 12/26/24--this will show us if there are any arrhythmias that can be increasing her risk of embolic stroke.  -Pt had some hypercoag w/u but I will do any missing tests today.   -Pt is on  mg/day and Brelinta.   -F/u in 2 weeks to review results and see if I recommend any other meds to lower her risk of stroke/clots.

## 2024-12-26 NOTE — REVIEW OF SYSTEMS
[Patient Intake Form Reviewed] : Patient intake form was reviewed [Vision Problems] : vision problems [Diarrhea: Grade 0] : Diarrhea: Grade 0 [Insomnia] : insomnia [Negative] : Allergic/Immunologic [Chest Pain] : no chest pain [Palpitations] : no palpitations [Shortness Of Breath] : no shortness of breath [Abdominal Pain] : no abdominal pain [FreeTextEntry9] : residual weakness from stroke; poor coordination  [de-identified] : sensitive skin from Lupus

## 2024-12-26 NOTE — ASSESSMENT
[FreeTextEntry1] : 63-y/o F, currently on Saphnelo infusions for her Lupus every month. Presents today for consultation for abnormalities in her hypercoagulable workup. Patient's LUZ, AT III, and protein C activity were elevated on her most recent blood work from 11/27/24.  Patient reports having 4 strokes this year.  Has on brelinta and ASA prior to surgery.  Had 2 strokes in May 24th and May 29, 2024 after surgery (stent in L carotid A aneurysm in 5/24/24)).  Could not speak after surgery on 5/24/24, stayed in ICU for 3 days.  On 5/29/24. had reduced vision, lasted for a day--didn't go to hospital but imaging on 6/4/24--is not sure if had another stroke.  Was on brelinta and ASA 81 mg/day, In 10/4/24, R side weakness--went to [facility], admitted for 2 days; could not discern if it was acute or old stroke.  On 10/7/24, had R sided weakness, went to hospital again; d/c'd on 10/11/24.  Was told to continue brelinta and ASA.    Patient reports having a bad Lupus flare around August 26, 2024. Has been on prednisone since then; was initially on 40mg but now down to 10mg. Reports symptoms with her autoimmune disease has been worse since she received the COVID vaccine, got 2 doses of Moderna vaccine.  Started saphnelo infusion in 9/18/24.  She also reports having elevated liver enzymes recently. Went for ABD US today.    Plan: -Patient is getting a loop recorder placed 12/26/24--this will show us if there are any arrhythmias that can be increasing her risk of embolic stroke.  -Pt had some hypercoag w/u but I will do any missing tests today.   -Pt is on  mg/day and Brelinta.   -F/u in 2 weeks to review results and see if I recommend any other meds to lower her risk of stroke/clots.

## 2024-12-26 NOTE — REVIEW OF SYSTEMS
[Patient Intake Form Reviewed] : Patient intake form was reviewed [Vision Problems] : vision problems [Diarrhea: Grade 0] : Diarrhea: Grade 0 [Insomnia] : insomnia [Negative] : Allergic/Immunologic [Chest Pain] : no chest pain [Palpitations] : no palpitations [Shortness Of Breath] : no shortness of breath [Abdominal Pain] : no abdominal pain [FreeTextEntry9] : residual weakness from stroke; poor coordination  [de-identified] : sensitive skin from Lupus

## 2024-12-26 NOTE — REVIEW OF SYSTEMS
[Patient Intake Form Reviewed] : Patient intake form was reviewed [Vision Problems] : vision problems [Diarrhea: Grade 0] : Diarrhea: Grade 0 [Insomnia] : insomnia [Negative] : Allergic/Immunologic [Chest Pain] : no chest pain [Palpitations] : no palpitations [Shortness Of Breath] : no shortness of breath [Abdominal Pain] : no abdominal pain [FreeTextEntry9] : residual weakness from stroke; poor coordination  [de-identified] : sensitive skin from Lupus

## 2024-12-26 NOTE — HISTORY OF PRESENT ILLNESS
[de-identified] : 63-y/o patient is currently on Saphnelo infusions for her Lupus every month. Presents today for consultation for abnormalities in her hypercoagulable workup. Patient's LUZ, AT III, and protein C activity were elevated on her most recent blood work from 11/27/24.  Patient reports having 4 strokes this year.  Had 2 strokes in May 24th and May 29, 2024 after surgery (stent in L carotid A aneurysm in 5/24/24)).  Could not speak after surgery on 5/24/24, stayed in ICU for 3 days.  On 5/29/24. had reduced vision, lasted for a day--didn't go to hospital but imaging on 6/4/24--is not sure if had another stroke.  In 10/4/24, R side weakness--went to [place], admitted for 2 days; could not discern if it was acute or old stroke.  On 10/7/24, had R sided weakness, went to hospital again; d/c'd on 10/11/24.    Patient reports having a bad Lupus flare around August 26, 2024. Has been on prednisone since then; was initially on 40mg but now down to 10mg. Reports symptoms with her autoimmune disease has been worse since she received the COVID vaccine, got 2 doses of Moderna vaccine.  Started saphnelo infusion in 9/18/24.  She also reports having elevated liver enzymes recently. Went for [facility] today.  Patient is getting a loop recorder placed 12/26/24. 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   No clots in legs or lungs before.  No FH of clots.    FH Pat GM--lung CA, smoker No known h/o stroke in family  Social Hx: Marital Status:   Children: 3 children Employment: owns her own business  Tobacco: never Alcohol: socially but stopped drinking all together  Living alone right now

## 2024-12-26 NOTE — ASSESSMENT
[FreeTextEntry1] : 63-y/o F, currently on Saphnelo infusions for her Lupus every month. Presents today for consultation for abnormalities in her hypercoagulable workup. Patient's LUZ, AT III, and protein C activity were elevated on her most recent blood work from 11/27/24.  Patient reports having 4 strokes this year.  Has on brelinta and ASA prior to surgery.  Had 2 strokes in May 24th and May 29, 2024 after surgery (stent in L carotid A aneurysm in 5/24/24)).  Could not speak after surgery on 5/24/24, stayed in ICU for 3 days.  On 5/29/24. had reduced vision, lasted for a day--didn't go to hospital but imaging on 6/4/24--is not sure if had another stroke.  Was on brelinta and ASA 81 mg/day, In 10/4/24, R side weakness--went to [place], admitted for 2 days; could not discern if it was acute or old stroke.  On 10/7/24, had R sided weakness, went to hospital again; d/c'd on 10/11/24.  Was told to continue brelinta and ASA.    Patient reports having a bad Lupus flare around August 26, 2024. Has been on prednisone since then; was initially on 40mg but now down to 10mg. Reports symptoms with her autoimmune disease has been worse since she received the COVID vaccine, got 2 doses of Moderna vaccine.  Started saphnelo infusion in 9/18/24.  She also reports having elevated liver enzymes recently. Went for [facility] today.    Plan: -Patient is getting a loop recorder placed 12/26/24--this will show us if there are any arrhythmias that can be increasing her risk of embolic stroke.  -Pt had some hypercoag w/u but I will do any missing tests today.   -Pt is on  mg/day and Brelinta.   -F/u in 2 weeks to review results and see if I recommend any other meds to lower her risk of stroke/clots.

## 2024-12-26 NOTE — HISTORY OF PRESENT ILLNESS
[de-identified] : 63-y/o patient is currently on Saphnelo infusions for her Lupus every month. Presents today for consultation for abnormalities in her hypercoagulable workup. Patient's LUZ, AT III, and protein C activity were elevated on her most recent blood work from 11/27/24.  Patient reports having 4 strokes this year.  Had 2 strokes in May 24th and May 29, 2024 after surgery (stent in L carotid A aneurysm in 5/24/24)).  Could not speak after surgery on 5/24/24, stayed in ICU for 3 days.  On 5/29/24. had reduced vision, lasted for a day--didn't go to hospital but imaging on 6/4/24--is not sure if had another stroke.  In 10/4/24, R side weakness--went to [place], admitted for 2 days; could not discern if it was acute or old stroke.  On 10/7/24, had R sided weakness, went to hospital again; d/c'd on 10/11/24.    Patient reports having a bad Lupus flare around August 26, 2024. Has been on prednisone since then; was initially on 40mg but now down to 10mg. Reports symptoms with her autoimmune disease has been worse since she received the COVID vaccine, got 2 doses of Moderna vaccine.  Started saphnelo infusion in 9/18/24.  She also reports having elevated liver enzymes recently. Went for [facility] today.  Patient is getting a loop recorder placed 12/26/24. 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   No clots in legs or lungs before.  No FH of clots.    FH Pat GM--lung CA, smoker No known h/o stroke in family  Social Hx: Marital Status:   Children: 3 children Employment: owns her own business  Tobacco: never Alcohol: socially but stopped drinking all together  Living alone right now

## 2024-12-26 NOTE — HISTORY OF PRESENT ILLNESS
[de-identified] : 63-y/o patient is currently on Saphnelo infusions for her Lupus every month. Presents today for consultation for abnormalities in her hypercoagulable workup. Patient's LUZ, AT III, and protein C activity were elevated on her most recent blood work from 11/27/24.  Patient reports having 4 strokes this year.  Had 2 strokes in May 24th and May 29, 2024 after surgery (stent in L carotid A aneurysm in 5/24/24)).  Could not speak after surgery on 5/24/24, stayed in ICU for 3 days.  On 5/29/24. had reduced vision, lasted for a day--didn't go to hospital but imaging on 6/4/24--is not sure if had another stroke.  In 10/4/24, R side weakness--went to [place], admitted for 2 days; could not discern if it was acute or old stroke.  On 10/7/24, had R sided weakness, went to hospital again; d/c'd on 10/11/24.    Patient reports having a bad Lupus flare around August 26, 2024. Has been on prednisone since then; was initially on 40mg but now down to 10mg. Reports symptoms with her autoimmune disease has been worse since she received the COVID vaccine, got 2 doses of Moderna vaccine.  Started saphnelo infusion in 9/18/24.  She also reports having elevated liver enzymes recently. Went for [facility] today.  Patient is getting a loop recorder placed 12/26/24. 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   No clots in legs or lungs before.  No FH of clots.    FH Pat GM--lung CA, smoker No known h/o stroke in family  Social Hx: Marital Status:   Children: 3 children Employment: owns her own business  Tobacco: never Alcohol: socially but stopped drinking all together  Living alone right now

## 2025-01-10 NOTE — HISTORY OF PRESENT ILLNESS
[de-identified] : 63-y/o patient is currently on Saphnelo infusions for her Lupus every month. Presents today for consultation for abnormalities in her hypercoagulable workup. Patient's LUZ, AT III, and protein C activity were elevated on her most recent blood work from 11/27/24.  Patient reports having 4 strokes this year.  Had 2 strokes in May 24th and May 29, 2024 after surgery (stent in L carotid A aneurysm in 5/24/24)).  Could not speak after surgery on 5/24/24, stayed in ICU for 3 days.  On 5/29/24. had reduced vision, lasted for a day--didn't go to hospital but imaging on 6/4/24--is not sure if had another stroke.  In 10/4/24, R side weakness--went to [place], admitted for 2 days; could not discern if it was acute or old stroke.  On 10/7/24, had R sided weakness, went to hospital again; d/c'd on 10/11/24.    Patient reports having a bad Lupus flare around August 26, 2024. Has been on prednisone since then; was initially on 40mg but now down to 10mg. Reports symptoms with her autoimmune disease has been worse since she received the COVID vaccine, got 2 doses of Moderna vaccine.  Started saphnelo infusion in 9/18/24.  She also reports having elevated liver enzymes recently. Went for [facility] today.  Patient is getting a loop recorder placed 12/26/24. 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   No clots in legs or lungs before.  No FH of clots.    FH Pat GM--lung CA, smoker No known h/o stroke in family  Social Hx: Marital Status:   Children: 3 children Employment: owns her own business  Tobacco: never Alcohol: socially but stopped drinking all together  Living alone right now    [de-identified] : Patient is here for follow up and to review blood work S/P loop recorder implanted 12/126 with  due to hx of CVA X 4-going for follow up next week Stopped atorvastatin due to elevated liver enzymes Stopped Saphnelo infusions for lupus as well due to liver enzyme elevation  Reports feeling well overall

## 2025-01-10 NOTE — REVIEW OF SYSTEMS
[Patient Intake Form Reviewed] : Patient intake form was reviewed [Vision Problems] : vision problems [Diarrhea: Grade 0] : Diarrhea: Grade 0 [Insomnia] : insomnia [Negative] : Allergic/Immunologic [Chest Pain] : no chest pain [Palpitations] : no palpitations [Shortness Of Breath] : no shortness of breath [Abdominal Pain] : no abdominal pain [FreeTextEntry9] : feels 99% recovered from CVA [de-identified] : sensitive skin from Lupus

## 2025-01-10 NOTE — HISTORY OF PRESENT ILLNESS
[de-identified] : 63-y/o patient is currently on Saphnelo infusions for her Lupus every month. Presents today for consultation for abnormalities in her hypercoagulable workup. Patient's LUZ, AT III, and protein C activity were elevated on her most recent blood work from 11/27/24.  Patient reports having 4 strokes this year.  Had 2 strokes in May 24th and May 29, 2024 after surgery (stent in L carotid A aneurysm in 5/24/24)).  Could not speak after surgery on 5/24/24, stayed in ICU for 3 days.  On 5/29/24. had reduced vision, lasted for a day--didn't go to hospital but imaging on 6/4/24--is not sure if had another stroke.  In 10/4/24, R side weakness--went to [place], admitted for 2 days; could not discern if it was acute or old stroke.  On 10/7/24, had R sided weakness, went to hospital again; d/c'd on 10/11/24.    Patient reports having a bad Lupus flare around August 26, 2024. Has been on prednisone since then; was initially on 40mg but now down to 10mg. Reports symptoms with her autoimmune disease has been worse since she received the COVID vaccine, got 2 doses of Moderna vaccine.  Started saphnelo infusion in 9/18/24.  She also reports having elevated liver enzymes recently. Went for [facility] today.  Patient is getting a loop recorder placed 12/26/24. 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   No clots in legs or lungs before.  No FH of clots.    FH Pat GM--lung CA, smoker No known h/o stroke in family  Social Hx: Marital Status:   Children: 3 children Employment: owns her own business  Tobacco: never Alcohol: socially but stopped drinking all together  Living alone right now    [de-identified] : Patient is here for follow up and to review blood work S/P loop recorder implanted 12/126 with  due to hx of CVA X 4-going for follow up next week Stopped atorvastatin due to elevated liver enzymes Stopped Saphnelo infusions for lupus as well due to liver enzyme elevation  Reports feeling well overall

## 2025-01-10 NOTE — HISTORY OF PRESENT ILLNESS
[de-identified] : 63-y/o patient is currently on Saphnelo infusions for her Lupus every month. Presents today for consultation for abnormalities in her hypercoagulable workup. Patient's LUZ, AT III, and protein C activity were elevated on her most recent blood work from 11/27/24.  Patient reports having 4 strokes this year.  Had 2 strokes in May 24th and May 29, 2024 after surgery (stent in L carotid A aneurysm in 5/24/24)).  Could not speak after surgery on 5/24/24, stayed in ICU for 3 days.  On 5/29/24. had reduced vision, lasted for a day--didn't go to hospital but imaging on 6/4/24--is not sure if had another stroke.  In 10/4/24, R side weakness--went to [place], admitted for 2 days; could not discern if it was acute or old stroke.  On 10/7/24, had R sided weakness, went to hospital again; d/c'd on 10/11/24.    Patient reports having a bad Lupus flare around August 26, 2024. Has been on prednisone since then; was initially on 40mg but now down to 10mg. Reports symptoms with her autoimmune disease has been worse since she received the COVID vaccine, got 2 doses of Moderna vaccine.  Started saphnelo infusion in 9/18/24.  She also reports having elevated liver enzymes recently. Went for [facility] today.  Patient is getting a loop recorder placed 12/26/24. 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   No clots in legs or lungs before.  No FH of clots.    FH Pat GM--lung CA, smoker No known h/o stroke in family  Social Hx: Marital Status:   Children: 3 children Employment: owns her own business  Tobacco: never Alcohol: socially but stopped drinking all together  Living alone right now    [de-identified] : Patient is here for follow up and to review blood work S/P loop recorder implanted 12/126 with  due to hx of CVA X 4-going for follow up next week Stopped atorvastatin due to elevated liver enzymes Stopped Saphnelo infusions for lupus as well due to liver enzyme elevation  Reports feeling well overall

## 2025-01-10 NOTE — REVIEW OF SYSTEMS
[Patient Intake Form Reviewed] : Patient intake form was reviewed [Vision Problems] : vision problems [Diarrhea: Grade 0] : Diarrhea: Grade 0 [Insomnia] : insomnia [Negative] : Allergic/Immunologic [Chest Pain] : no chest pain [Palpitations] : no palpitations [Shortness Of Breath] : no shortness of breath [Abdominal Pain] : no abdominal pain [FreeTextEntry9] : feels 99% recovered from CVA [de-identified] : sensitive skin from Lupus

## 2025-01-10 NOTE — ASSESSMENT
[FreeTextEntry1] : 63-y/o F, currently on Saphnelo infusions for her Lupus every month. Presents today for consultation for abnormalities in her hypercoagulable workup. Patient's LUZ, AT III, and protein C activity were elevated on her most recent blood work from 11/27/24.  Patient reports having 4 strokes this year.  Has on brelinta and ASA prior to surgery.  Had 2 strokes in May 24th and May 29, 2024 after surgery (stent in L carotid A aneurysm in 5/24/24)).  Could not speak after surgery on 5/24/24, stayed in ICU for 3 days.  On 5/29/24. had reduced vision, lasted for a day--didn't go to hospital but imaging on 6/4/24--is not sure if had another stroke.  Was on brelinta and ASA 81 mg/day, In 10/4/24, R side weakness--went to [place], admitted for 2 days; could not discern if it was acute or old stroke.  On 10/7/24, had R sided weakness, went to hospital again; d/c'd on 10/11/24.  Was told to continue brelinta and ASA.    Patient reports having a bad Lupus flare around August 26, 2024. Has been on prednisone since then; was initially on 40mg but now down to 10mg. Reports symptoms with her autoimmune disease has been worse since she received the COVID vaccine, got 2 doses of Moderna vaccine.  Started saphnelo infusion in 9/18/24.  She also reports having elevated liver enzymes recently. Went for [facility] today.    Plan: -Patient is getting a loop recorder placed 12/26/24--this will show us if there are any arrhythmias that can be increasing her risk of embolic stroke.  -Pt had some hypercoag w/u but I did some tests to complete the w/u.     -Pt is on  mg/day and Brelinta.   -My w/u showed that she has a high protein C activity of 227%.  Rest of w/u is normal.  -High protein C can be associated with nephrotic syndrome, which is possible with her dx of SLE -Will send for 24 hour urine for protein, creatinine and monoclonal protein to assess. -In case she has nephrotic level proteinuria, will need to send to nephrology and also, consider starting her on an an additional anticoagulant like lovenox/coumadin.  -F/u in 2 weeks to review results

## 2025-01-10 NOTE — ASSESSMENT
[FreeTextEntry1] : 63-y/o F, currently on Saphnelo infusions for her Lupus every month. Presents today for consultation for abnormalities in her hypercoagulable workup. Patient's LUZ, AT III, and protein C activity were elevated on her most recent blood work from 11/27/24.  Patient reports having 4 strokes this year.  Has on brelinta and ASA prior to surgery.  Had 2 strokes in May 24th and May 29, 2024 after surgery (stent in L carotid A aneurysm in 5/24/24)).  Could not speak after surgery on 5/24/24, stayed in ICU for 3 days.  On 5/29/24. had reduced vision, lasted for a day--didn't go to hospital but imaging on 6/4/24--is not sure if had another stroke.  Was on brelinta and ASA 81 mg/day, In 10/4/24, R side weakness--went to [place], admitted for 2 days; could not discern if it was acute or old stroke.  On 10/7/24, had R sided weakness, went to hospital again; d/c'd on 10/11/24.  Was told to continue brelinta and ASA.    Patient reports having a bad Lupus flare around August 26, 2024. Has been on prednisone since then; was initially on 40mg but now down to 10mg. Reports symptoms with her autoimmune disease has been worse since she received the COVID vaccine, got 2 doses of Moderna vaccine.  Started saphnelo infusion in 9/18/24.  She also reports having elevated liver enzymes recently. Went for [facility] today.    Plan: -Patient is getting a loop recorder placed 12/26/24--this will show us if there are any arrhythmias that can be increasing her risk of embolic stroke.  -Pt had some hypercoag w/u but I did some tests to complete the w/u.     -Pt is on  mg/day and Brelinta.   -My w/u showed that she has a high protein C activity of 227%.  Rest of w/u is normal.  -High protein C can be associated with nephrotic syndrome, which is possible with her dx of SLE -Will send for 24 hour urine for protein, creatinine and monoclonal protein to assess. -In case she has nephrotic level proteinuria, will need to send to nephrology and also, consider starting her on an an additional anticoagulant like lovenox/coumadin.  -F/u in 2 weeks to review results

## 2025-01-10 NOTE — REASON FOR VISIT
[Initial Consultation] : an initial consultation for [Follow-Up Visit] : a follow-up visit for [FreeTextEntry2] : Recurrent stroke

## 2025-01-10 NOTE — ASSESSMENT
[FreeTextEntry1] : 63-y/o F, currently on Saphnelo infusions for her Lupus every month. Presents today for consultation for abnormalities in her hypercoagulable workup. Patient's LUZ, AT III, and protein C activity were elevated on her most recent blood work from 11/27/24.  Patient reports having 4 strokes this year.  Has on brelinta and ASA prior to surgery.  Had 2 strokes in May 24th and May 29, 2024 after surgery (stent in L carotid A aneurysm in 5/24/24)).  Could not speak after surgery on 5/24/24, stayed in ICU for 3 days.  On 5/29/24. had reduced vision, lasted for a day--didn't go to hospital but imaging on 6/4/24--is not sure if had another stroke.  Was on brelinta and ASA 81 mg/day, In 10/4/24, R side weakness--went to [place], admitted for 2 days; could not discern if it was acute or old stroke.  On 10/7/24, had R sided weakness, went to hospital again; d/c'd on 10/11/24.  Was told to continue brelinta and ASA.    Patient reports having a bad Lupus flare around August 26, 2024. Has been on prednisone since then; was initially on 40mg but now down to 10mg. Reports symptoms with her autoimmune disease has been worse since she received the COVID vaccine, got 2 doses of Moderna vaccine.  Started saphnelo infusion in 9/18/24.  She also reports having elevated liver enzymes recently. Went for [facility] today.    Plan: -Patient is getting a loop recorder placed 12/26/24--this will show us if there are any arrhythmias that can be increasing her risk of embolic stroke.  -Pt had some hypercoag w/u but I did some tests to complete the w/u.     -Pt is on  mg/day and Brelinta.   -My w/u showed that she has a high protein C activity of 227%.  Rest of w/u is normal.  -High protein C can be associated with nephrotic syndrome, which is possible with her dx of SLE -Will send for 24 hour urine for protein, creatinine and monoclonal protein to assess. -In case she has nephrotic level proteinuria, will need to send to nephrology and also, consider starting her on an an additional anticoagulant like lovenox/coumadin.  -F/u in 2 weeks to review results

## 2025-01-10 NOTE — REVIEW OF SYSTEMS
[Patient Intake Form Reviewed] : Patient intake form was reviewed [Vision Problems] : vision problems [Diarrhea: Grade 0] : Diarrhea: Grade 0 [Insomnia] : insomnia [Negative] : Allergic/Immunologic [Chest Pain] : no chest pain [Palpitations] : no palpitations [Shortness Of Breath] : no shortness of breath [Abdominal Pain] : no abdominal pain [FreeTextEntry9] : feels 99% recovered from CVA [de-identified] : sensitive skin from Lupus

## 2025-01-22 NOTE — ASSESSMENT
[FreeTextEntry1] : 63 yo female with SLE, Sjogren's, hypothyroidism, meningioma of left optic nerve sheath -> radiation therapy 7/2024, endovascular flow diverter stent (x2) embolization of an approximately 1.5c m x 1 cm multilobulated, dysplastic, wide-necked left ophthalmic segment internal carotid artery aneurysm on 5/24/24. Patient with acute/subacute lacunar infarction within the posterior limb of the left internal capsule per 10/7/24 brain MRI. Patient with implant of loop recorder on 12/26/24 ([employer] EL+ ICM 5500).  Interrogation of loop recorder today in the office has not demonstrated atrial fibrillation/flutter since 12/26/24 implant. Will continue to monitor loop recorder remotely. Will notify patient if any significant arrhythmias are detected.  Given CVA, will continue aspirin 325 mg po daily, Brilinta 60 mg po bid, and atorvastatin. Will defer continuation of Brilinta to neurology/neurosurgery.

## 2025-01-22 NOTE — PHYSICAL EXAM
[Well Developed] : well developed [Well Nourished] : well nourished [No Acute Distress] : no acute distress [Normal Conjunctiva] : normal conjunctiva [Clear Lung Fields] : clear lung fields [Good Air Entry] : good air entry [No Respiratory Distress] : no respiratory distress  [Moves all extremities] : moves all extremities [No Focal Deficits] : no focal deficits [Normal Speech] : normal speech [Alert and Oriented] : alert and oriented [Normal memory] : normal memory [Normal Rate] : normal [Rhythm Regular] : regular [de-identified] : Appears in no distress lying flat [de-identified] : no neck vein distention [de-identified] : No swelling or eryhtema at loop recorder implant site [de-identified] : pleasant

## 2025-01-22 NOTE — PHYSICAL EXAM
[Well Developed] : well developed [Well Nourished] : well nourished [No Acute Distress] : no acute distress [Normal Conjunctiva] : normal conjunctiva [Clear Lung Fields] : clear lung fields [Good Air Entry] : good air entry [No Respiratory Distress] : no respiratory distress  [Moves all extremities] : moves all extremities [No Focal Deficits] : no focal deficits [Normal Speech] : normal speech [Alert and Oriented] : alert and oriented [Normal memory] : normal memory [Normal Rate] : normal [Rhythm Regular] : regular [de-identified] : Appears in no distress lying flat [de-identified] : no neck vein distention [de-identified] : No swelling or eryhtema at loop recorder implant site [de-identified] : pleasant

## 2025-01-22 NOTE — REVIEW OF SYSTEMS
[Feeling Fatigued] : feeling fatigued [Negative] : Heme/Lymph [FreeTextEntry3] : glasses [FreeTextEntry5] : see history of present illness

## 2025-01-22 NOTE — ASSESSMENT
[FreeTextEntry1] : 61 yo female with SLE, Sjogren's, hypothyroidism, meningioma of left optic nerve sheath -> radiation therapy 7/2024, endovascular flow diverter stent (x2) embolization of an approximately 1.5c m x 1 cm multilobulated, dysplastic, wide-necked left ophthalmic segment internal carotid artery aneurysm on 5/24/24. Patient with acute/subacute lacunar infarction within the posterior limb of the left internal capsule per 10/7/24 brain MRI. Patient with implant of loop recorder on 12/26/24 ([employer] EL+ ICM 5500).  Interrogation of loop recorder today in the office has not demonstrated atrial fibrillation/flutter since 12/26/24 implant. Will continue to monitor loop recorder remotely. Will notify patient if any significant arrhythmias are detected.  Given CVA, will continue aspirin 325 mg po daily, Brilinta 60 mg po bid, and atorvastatin. Will defer continuation of Brilinta to neurology/neurosurgery.

## 2025-01-22 NOTE — HISTORY OF PRESENT ILLNESS
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 Patient presents today after [facility] hospitalization on 10/5/24 for acute CVA characterized by right sided weakness, right facial droop, right vision loss, and speech disturbance. Brain on 10/5/24 reported acute/subacute small anterior left temporal lobe infarction, but was suspected to be an old injury with T2 shine through. Brain MRI on 10/7/24 reported evolving acute/subacute lacunar infarction within the posterior limb of the left internal capsule with associated cytotoxic edema.   Patient presents today for device interrogation following implant of loop recorder on 12/26/24 ([employer] EL+ ICM 5500). Patient denies chest pain, dyspnea, palpitations, syncope, edema, melena, hematochezia, or hematemesis.

## 2025-01-22 NOTE — CARDIOLOGY SUMMARY
[de-identified] : 11/27/24 ECG: Sinus bradycardia, rate 55 bpm 10/7/24 ECG (at [place]): Sinus rhythm, rate 70 bpm [de-identified] : 10/6/24 Echo (at [place]): Normal LV systolic function, LVEF 58%. Mild LVH. Normal RV size and systolic function. Trace MR/TR. Negative bubble study.

## 2025-01-22 NOTE — HISTORY OF PRESENT ILLNESS
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 Patient presents today after [facility] hospitalization on 10/5/24 for acute CVA characterized by right sided weakness, right facial droop, right vision loss, and speech disturbance. Brain on 10/5/24 reported acute/subacute small anterior left temporal lobe infarction, but was suspected to be an old injury with T2 shine through. Brain MRI on 10/7/24 reported evolving acute/subacute lacunar infarction within the posterior limb of the left internal capsule with associated cytotoxic edema.   Patient presents today for device interrogation following implant of loop recorder on 12/26/24 ([employer] EL+ ICM 5500). Patient denies chest pain, dyspnea, palpitations, syncope, edema, melena, hematochezia, or hematemesis.

## 2025-01-22 NOTE — CARDIOLOGY SUMMARY
[de-identified] : 11/27/24 ECG: Sinus bradycardia, rate 55 bpm 10/7/24 ECG (at [place]): Sinus rhythm, rate 70 bpm [de-identified] : 10/6/24 Echo (at [place]): Normal LV systolic function, LVEF 58%. Mild LVH. Normal RV size and systolic function. Trace MR/TR. Negative bubble study.

## 2025-01-23 NOTE — HISTORY OF PRESENT ILLNESS
[de-identified] : 63-y/o patient is currently on Saphnelo infusions for her Lupus every month. Presents today for consultation for abnormalities in her hypercoagulable workup. Patient's LUZ, AT III, and protein C activity were elevated on her most recent blood work from 11/27/24.  Patient reports having 4 strokes this year.  Had 2 strokes in May 24th and May 29, 2024 after surgery (stent in L carotid A aneurysm in 5/24/24)).  Could not speak after surgery on 5/24/24, stayed in ICU for 3 days.  On 5/29/24. had reduced vision, lasted for a day--didn't go to hospital but imaging on 6/4/24--is not sure if had another stroke.  In 10/4/24, R side weakness--went to [place], admitted for 2 days; could not discern if it was acute or old stroke.  On 10/7/24, had R sided weakness, went to hospital again; d/c'd on 10/11/24.    Patient reports having a bad Lupus flare around August 26, 2024. Has been on prednisone since then; was initially on 40mg but now down to 10mg. Reports symptoms with her autoimmune disease has been worse since she received the COVID vaccine, got 2 doses of Moderna vaccine.  Started saphnelo infusion in 9/18/24.  She also reports having elevated liver enzymes recently. Went for [facility] today.  Patient is getting a loop recorder placed 12/26/24. 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   No clots in legs or lungs before.  No FH of clots.    FH Pat GM--lung CA, smoker No known h/o stroke in family  Social Hx: Marital Status:   Children: 3 children Employment: owns her own business  Tobacco: never Alcohol: socially but stopped drinking all together  Living alone right now    [de-identified] : Patient is here for follow up and to review blood work S/P loop recorder implanted 12/126 with  due to hx of CVA X 4-going for follow up next week Stopped atorvastatin due to elevated liver enzymes Stopped Saphnelo infusions for lupus as well due to liver enzyme elevation  Reports feeling well overall

## 2025-01-23 NOTE — REVIEW OF SYSTEMS
[Patient Intake Form Reviewed] : Patient intake form was reviewed [Vision Problems] : vision problems [Diarrhea: Grade 0] : Diarrhea: Grade 0 [Insomnia] : insomnia [Negative] : Allergic/Immunologic [Chest Pain] : no chest pain [Palpitations] : no palpitations [Shortness Of Breath] : no shortness of breath [Abdominal Pain] : no abdominal pain [FreeTextEntry9] : feels 99% recovered from CVA [de-identified] : sensitive skin from Lupus

## 2025-01-23 NOTE — ASSESSMENT
[FreeTextEntry1] : 63-y/o F, currently on Saphnelo infusions for her Lupus every month. Presents today for consultation for abnormalities in her hypercoagulable workup. Patient's LUZ, AT III, and protein C activity were elevated on her most recent blood work from 11/27/24.  Patient reports having 4 strokes this year.  Has on brelinta and ASA prior to surgery.  Had 2 strokes in May 24th and May 29, 2024 after surgery (stent in L carotid A aneurysm in 5/24/24)).  Could not speak after surgery on 5/24/24, stayed in ICU for 3 days.  On 5/29/24. had reduced vision, lasted for a day--didn't go to hospital but imaging on 6/4/24--is not sure if had another stroke.  Was on brelinta and ASA 81 mg/day, In 10/4/24, R side weakness--went to [place], admitted for 2 days; could not discern if it was acute or old stroke.  On 10/7/24, had R sided weakness, went to hospital again; d/c'd on 10/11/24.  Was told to continue brelinta and ASA.    Patient reports having a bad Lupus flare around August 26, 2024. Has been on prednisone since then; was initially on 40mg but now down to 10mg. Reports symptoms with her autoimmune disease has been worse since she received the COVID vaccine, got 2 doses of Moderna vaccine.  Started saphnelo infusion in 9/18/24.  She also reports having elevated liver enzymes recently. Went for [facility] US today.    Plan: -Patient is getting a loop recorder placed 12/26/24--this will show us if there are any arrhythmias that can be increasing her risk of embolic stroke.  -Pt had some hypercoag w/u but I did some tests to complete the w/u.     -Pt is on  mg/day and Brelinta.   -My w/u showed that she has a high protein C activity of 227%.  Rest of w/u is normal.  -High protein C can be associated with nephrotic syndrome, which is possible with her dx of SLE -Sent for 24 hour urine for protein, creatinine and monoclonal protein to assess. -In case she has nephrotic level proteinuria, will need to send to nephrology and also, consider starting her on an an additional anticoagulant like lovenox/coumadin.  -F/u in 2 weeks to review results

## 2025-01-23 NOTE — ASSESSMENT
[FreeTextEntry1] : 63-y/o F, currently on Saphnelo infusions for her Lupus every month. Presents today for consultation for abnormalities in her hypercoagulable workup. Patient's LUZ, AT III, and protein C activity were elevated on her most recent blood work from 11/27/24.  Patient reports having 4 strokes this year.  Has on brelinta and ASA prior to surgery.  Had 2 strokes in May 24th and May 29, 2024 after surgery (stent in L carotid A aneurysm in 5/24/24)).  Could not speak after surgery on 5/24/24, stayed in ICU for 3 days.  On 5/29/24. had reduced vision, lasted for a day--didn't go to hospital but imaging on 6/4/24--is not sure if had another stroke.  Was on brelinta and ASA 81 mg/day, In 10/4/24, R side weakness--went to [place], admitted for 2 days; could not discern if it was acute or old stroke.  On 10/7/24, had R sided weakness, went to hospital again; d/c'd on 10/11/24.  Was told to continue brelinta and ASA.    Patient reports having a bad Lupus flare around August 26, 2024. Has been on prednisone since then; was initially on 40mg but now down to 10mg. Reports symptoms with her autoimmune disease has been worse since she received the COVID vaccine, got 2 doses of Moderna vaccine.  Started saphnelo infusion in 9/18/24.  She also reports having elevated liver enzymes recently. Went for [facility] US today.    Plan: -Patient is getting a loop recorder placed 12/26/24--this will show us if there are any arrhythmias that can be increasing her risk of embolic stroke.  -Pt had some hypercoag w/u but I did some tests to complete the w/u.     -Pt is on  mg/day and Brelinta.   -My w/u showed that she has a high protein C activity of 227%.  Rest of w/u is normal.  -High protein C can be associated with nephrotic syndrome, which is possible with her dx of SLE -Sent for 24 hour urine for protein, creatinine and monoclonal protein to assess. -In case she has nephrotic level proteinuria, will need to send to nephrology and also, consider starting her on an an additional anticoagulant like lovenox/coumadin.  -F/u in 2 weeks to review results

## 2025-01-23 NOTE — REVIEW OF SYSTEMS
[Patient Intake Form Reviewed] : Patient intake form was reviewed [Vision Problems] : vision problems [Diarrhea: Grade 0] : Diarrhea: Grade 0 [Insomnia] : insomnia [Negative] : Allergic/Immunologic [Chest Pain] : no chest pain [Palpitations] : no palpitations [Shortness Of Breath] : no shortness of breath [Abdominal Pain] : no abdominal pain [FreeTextEntry9] : feels 99% recovered from CVA [de-identified] : sensitive skin from Lupus

## 2025-01-23 NOTE — HISTORY OF PRESENT ILLNESS
[de-identified] : 63-y/o patient is currently on Saphnelo infusions for her Lupus every month. Presents today for consultation for abnormalities in her hypercoagulable workup. Patient's LUZ, AT III, and protein C activity were elevated on her most recent blood work from 11/27/24.  Patient reports having 4 strokes this year.  Had 2 strokes in May 24th and May 29, 2024 after surgery (stent in L carotid A aneurysm in 5/24/24)).  Could not speak after surgery on 5/24/24, stayed in ICU for 3 days.  On 5/29/24. had reduced vision, lasted for a day--didn't go to hospital but imaging on 6/4/24--is not sure if had another stroke.  In 10/4/24, R side weakness--went to [place], admitted for 2 days; could not discern if it was acute or old stroke.  On 10/7/24, had R sided weakness, went to hospital again; d/c'd on 10/11/24.    Patient reports having a bad Lupus flare around August 26, 2024. Has been on prednisone since then; was initially on 40mg but now down to 10mg. Reports symptoms with her autoimmune disease has been worse since she received the COVID vaccine, got 2 doses of Moderna vaccine.  Started saphnelo infusion in 9/18/24.  She also reports having elevated liver enzymes recently. Went for [facility] today.  Patient is getting a loop recorder placed 12/26/24. 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   No clots in legs or lungs before.  No FH of clots.    FH Pat GM--lung CA, smoker No known h/o stroke in family  Social Hx: Marital Status:   Children: 3 children Employment: owns her own business  Tobacco: never Alcohol: socially but stopped drinking all together  Living alone right now    [de-identified] : Patient is here for follow up and to review blood work S/P loop recorder implanted 12/126 with  due to hx of CVA X 4-going for follow up next week Stopped atorvastatin due to elevated liver enzymes Stopped Saphnelo infusions for lupus as well due to liver enzyme elevation  Reports feeling well overall no

## 2025-03-04 NOTE — PHYSICAL EXAM
[General Appearance - Alert] : alert [General Appearance - In No Acute Distress] : in no acute distress [Oriented To Time, Place, And Person] : oriented to person, place, and time [Cranial Nerves Optic (II)] : visual acuity intact bilaterally,  pupils equal round and reactive to light [Cranial Nerves Oculomotor (III)] : extraocular motion intact [Cranial Nerves Trigeminal (V)] : facial sensation intact symmetrically [Cranial Nerves Facial (VII)] : face symmetrical [Cranial Nerves Vestibulocochlear (VIII)] : hearing was intact bilaterally [Cranial Nerves Glossopharyngeal (IX)] : tongue and palate midline [Cranial Nerves Accessory (XI - Cranial And Spinal)] : head turning and shoulder shrug symmetric [Cranial Nerves Hypoglossal (XII)] : there was no tongue deviation with protrusion [Motor Tone] : muscle tone was normal in all four extremities [Motor Strength] : muscle strength was normal in all four extremities [Balance] : balance was intact [PERRL With Normal Accommodation] : pupils were equal in size, round, reactive to light, with normal accommodation [Extraocular Movements] : extraocular movements were intact [Full Visual Field] : full visual field [Abnormal Walk] : normal gait

## 2025-03-10 NOTE — DATA REVIEWED
[de-identified] : MRI Orbits , face, neck with and without contrast 2/21/2025:  IMPRESSION: Complex examination.  Findings consistent with grossly stable left  optic nerve sheath meningioma with intraorbital and intracranial involvement.     Abnormal appearance of the left internal carotid artery in patient with  previously stented aneurysm with appearance as discussed above.  Correlate  clinically.

## 2025-03-10 NOTE — ASSESSMENT
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 She is also status post acute ischemic infarction within the posterior limb of the left internal capsule 10/5/24 and is currently being managed by neurosurgery, vascular neurology, hematology, neuro ophthalmology, and radiation oncology. 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 Marlow.   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 Neuro-ophthalmologic evaluation remains stable. The patient continues to follow longitudinally with Dr. Marlow for tumor management, and I of course defer the timing of surveillance imaging to Dr. Marlow and his team.  I am extremely pleased with Ms Jung's stable neuro ophthalmologic evaluation, stable imaging findings, and exceptional neurologic recovery at this time.    I have recommended follow up diagnostic cerebral angiography to evaluate for flow diversion treatment efficacy. Risks including but not limited to bleeding, infection, vascular injury, life threatening hematoma, limb ischemia, need for surgical repair, stroke, coma/death, weakness/paralysis, visual/sensory loss, loss of speech/language/cognitive/memory function, changes in personality/behavior, failure of procedure, need for additional procedures discussed. She understands risks, benefits, alternatives and wishes to proceed. We will therefore schedule her for left internal carotid artery diagnostic angiography with conscious sedation in the near future. At that time, we will determine the appropriateness of discontinuation of Brilinta and dosage adjustment of ASA to 81mg po qd.     I have asked the patient to contact me for any symptomatic development or progression in the interim at which time we can obtain expedited follow up imaging.   A total of 45 minutes was spent relative to this encounter.

## 2025-03-10 NOTE — DATA REVIEWED
[de-identified] : MRI Orbits , face, neck with and without contrast 2/21/2025:  IMPRESSION: Complex examination.  Findings consistent with grossly stable left  optic nerve sheath meningioma with intraorbital and intracranial involvement.     Abnormal appearance of the left internal carotid artery in patient with  previously stented aneurysm with appearance as discussed above.  Correlate  clinically.

## 2025-03-10 NOTE — ASSESSMENT
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 She is also status post acute ischemic infarction within the posterior limb of the left internal capsule 10/5/24 and is currently being managed by neurosurgery, vascular neurology, hematology, neuro ophthalmology, and radiation oncology. 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 Marlow.   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 Neuro-ophthalmologic evaluation remains stable. The patient continues to follow longitudinally with Dr. Marlow for tumor management, and I of course defer the timing of surveillance imaging to Dr. Marlow and his team.  I am extremely pleased with Ms Jung's stable neuro ophthalmologic evaluation, stable imaging findings, and exceptional neurologic recovery at this time.    I have recommended follow up diagnostic cerebral angiography to evaluate for flow diversion treatment efficacy. Risks including but not limited to bleeding, infection, vascular injury, life threatening hematoma, limb ischemia, need for surgical repair, stroke, coma/death, weakness/paralysis, visual/sensory loss, loss of speech/language/cognitive/memory function, changes in personality/behavior, failure of procedure, need for additional procedures discussed. She understands risks, benefits, alternatives and wishes to proceed. We will therefore schedule her for left internal carotid artery diagnostic angiography with conscious sedation in the near future. At that time, we will determine the appropriateness of discontinuation of Brilinta and dosage adjustment of ASA to 81mg po qd.     I have asked the patient to contact me for any symptomatic development or progression in the interim at which time we can obtain expedited follow up imaging.   A total of 45 minutes was spent relative to this encounter.

## 2025-03-10 NOTE — HISTORY OF PRESENT ILLNESS
[FreeTextEntry1] : SAMMY JUNG is a 63 year female seen today in neurosurgery follow up and for imaging review. Previous notes and interval history reviewed. The patient denies new neurological symptoms and endorses significant interval recovery with right hemiparesis and expressive speech. She remains on  and Brilinta 60 mg BID. She has seen Dr. Meghan Cordova who has not made changes to her medication at this time and will follow longitudinally for secondary stroke prevention. Her atorvastatin 80 mg was previoulsy decreased to 40 mg due to hepatic dysfunction by report.  She underwent a hypercoagulable workup with hematology and had a high protein C level which can be associated with nephrotic syndrome and possible with her SLE.  She was seen by Dr. Orlando Pennington 12/5/24 and her visual field testing showed overall stable RNFL thickness bilaterally and probable focal area of RNFL thinning superiorly of the left eye (Average RNFL thickness: 86 microns OD 87 microns OS Average GCL thickness: 78 microns OD 76 microns OS (Normal OU). She also had a loop recorder placed 12/26/24 and there have been no events detected at this point by report. She has undergone updated MRI Orbits with and without gadolinium on 2/21/25 which I have independently reviewed and detailed below.

## 2025-03-10 NOTE — ASSESSMENT
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 She is also status post acute ischemic infarction within the posterior limb of the left internal capsule 10/5/24 and is currently being managed by neurosurgery, vascular neurology, hematology, neuro ophthalmology, and radiation oncology. 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 Marlow.   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 Neuro-ophthalmologic evaluation remains stable. The patient continues to follow longitudinally with Dr. Marlow for tumor management, and I of course defer the timing of surveillance imaging to Dr. Marlow and his team.  I am extremely pleased with Ms Jung's stable neuro ophthalmologic evaluation, stable imaging findings, and exceptional neurologic recovery at this time.    I have recommended follow up diagnostic cerebral angiography to evaluate for flow diversion treatment efficacy. Risks including but not limited to bleeding, infection, vascular injury, life threatening hematoma, limb ischemia, need for surgical repair, stroke, coma/death, weakness/paralysis, visual/sensory loss, loss of speech/language/cognitive/memory function, changes in personality/behavior, failure of procedure, need for additional procedures discussed. She understands risks, benefits, alternatives and wishes to proceed. We will therefore schedule her for left internal carotid artery diagnostic angiography with conscious sedation in the near future. At that time, we will determine the appropriateness of discontinuation of Brilinta and dosage adjustment of ASA to 81mg po qd.     I have asked the patient to contact me for any symptomatic development or progression in the interim at which time we can obtain expedited follow up imaging.   A total of 45 minutes was spent relative to this encounter.

## 2025-03-10 NOTE — ASSESSMENT
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 She is also status post acute ischemic infarction within the posterior limb of the left internal capsule 10/5/24 and is currently being managed by neurosurgery, vascular neurology, hematology, neuro ophthalmology, and radiation oncology. 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 Marlow.   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 Neuro-ophthalmologic evaluation remains stable. The patient continues to follow longitudinally with Dr. Marlow for tumor management, and I of course defer the timing of surveillance imaging to Dr. Marlow and his team.  I am extremely pleased with Ms Jung's stable neuro ophthalmologic evaluation, stable imaging findings, and exceptional neurologic recovery at this time.    I have recommended follow up diagnostic cerebral angiography to evaluate for flow diversion treatment efficacy. Risks including but not limited to bleeding, infection, vascular injury, life threatening hematoma, limb ischemia, need for surgical repair, stroke, coma/death, weakness/paralysis, visual/sensory loss, loss of speech/language/cognitive/memory function, changes in personality/behavior, failure of procedure, need for additional procedures discussed. She understands risks, benefits, alternatives and wishes to proceed. We will therefore schedule her for left internal carotid artery diagnostic angiography with conscious sedation in the near future. At that time, we will determine the appropriateness of discontinuation of Brilinta and dosage adjustment of ASA to 81mg po qd.     I have asked the patient to contact me for any symptomatic development or progression in the interim at which time we can obtain expedited follow up imaging.   A total of 45 minutes was spent relative to this encounter.

## 2025-03-10 NOTE — DATA REVIEWED
[de-identified] : MRI Orbits , face, neck with and without contrast 2/21/2025:  IMPRESSION: Complex examination.  Findings consistent with grossly stable left  optic nerve sheath meningioma with intraorbital and intracranial involvement.     Abnormal appearance of the left internal carotid artery in patient with  previously stented aneurysm with appearance as discussed above.  Correlate  clinically.

## 2025-03-10 NOTE — DATA REVIEWED
[de-identified] : MRI Orbits , face, neck with and without contrast 2/21/2025:  IMPRESSION: Complex examination.  Findings consistent with grossly stable left  optic nerve sheath meningioma with intraorbital and intracranial involvement.     Abnormal appearance of the left internal carotid artery in patient with  previously stented aneurysm with appearance as discussed above.  Correlate  clinically.

## 2025-03-10 NOTE — ASSESSMENT
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 She is also status post acute ischemic infarction within the posterior limb of the left internal capsule 10/5/24 and is currently being managed by neurosurgery, vascular neurology, hematology, neuro ophthalmology, and radiation oncology. 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 Marlow.   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 Neuro-ophthalmologic evaluation remains stable. The patient continues to follow longitudinally with Dr. Marlow for tumor management, and I of course defer the timing of surveillance imaging to Dr. Marlow and his team.  I am extremely pleased with Ms Jung's stable neuro ophthalmologic evaluation, stable imaging findings, and exceptional neurologic recovery at this time.    I have recommended follow up diagnostic cerebral angiography to evaluate for flow diversion treatment efficacy. Risks including but not limited to bleeding, infection, vascular injury, life threatening hematoma, limb ischemia, need for surgical repair, stroke, coma/death, weakness/paralysis, visual/sensory loss, loss of speech/language/cognitive/memory function, changes in personality/behavior, failure of procedure, need for additional procedures discussed. She understands risks, benefits, alternatives and wishes to proceed. We will therefore schedule her for left internal carotid artery diagnostic angiography with conscious sedation in the near future. At that time, we will determine the appropriateness of discontinuation of Brilinta and dosage adjustment of ASA to 81mg po qd.     I have asked the patient to contact me for any symptomatic development or progression in the interim at which time we can obtain expedited follow up imaging.   A total of 45 minutes was spent relative to this encounter.

## 2025-03-10 NOTE — DATA REVIEWED
[de-identified] : MRI Orbits , face, neck with and without contrast 2/21/2025:  IMPRESSION: Complex examination.  Findings consistent with grossly stable left  optic nerve sheath meningioma with intraorbital and intracranial involvement.     Abnormal appearance of the left internal carotid artery in patient with  previously stented aneurysm with appearance as discussed above.  Correlate  clinically.

## 2025-04-14 NOTE — PHYSICAL EXAM
[General Appearance - Alert] : alert [General Appearance - In No Acute Distress] : in no acute distress [Oriented To Time, Place, And Person] : oriented to person, place, and time [Cranial Nerves Optic (II)] : visual acuity intact bilaterally,  pupils equal round and reactive to light [Cranial Nerves Oculomotor (III)] : extraocular motion intact [Cranial Nerves Trigeminal (V)] : facial sensation intact symmetrically [Cranial Nerves Facial (VII)] : face symmetrical [Cranial Nerves Vestibulocochlear (VIII)] : hearing was intact bilaterally [Cranial Nerves Glossopharyngeal (IX)] : tongue and palate midline [Cranial Nerves Accessory (XI - Cranial And Spinal)] : head turning and shoulder shrug symmetric [Cranial Nerves Hypoglossal (XII)] : there was no tongue deviation with protrusion [Motor Tone] : muscle tone was normal in all four extremities [Motor Strength] : muscle strength was normal in all four extremities [Abnormal Walk] : normal gait [Balance] : balance was intact [PERRL With Normal Accommodation] : pupils were equal in size, round, reactive to light, with normal accommodation [Extraocular Movements] : extraocular movements were intact [Full Visual Field] : full visual field

## 2025-04-15 NOTE — HISTORY OF PRESENT ILLNESS
[FreeTextEntry1] : Ms Gar is a 63-year-old female PMHx of Sjogren's disease, pre-diabetes who presented to Dr Pennington and [place] with visual disturbances of the LEFT eye. 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  Describes cloudy vision in the inferior visual field of her left eye. Associated blurry vision upon awakening, which improves throughout the day. Associated photosensitivity as well. Reports significant change in her eyeglasses prescription this year as well, notable for her left vision worse than her right. She has followed with an ophthalmologist for several years. Underwent routine evaluation in March 2024, demonstrating edema of the LEFT optic disc. She was subsequently referred to Dr. Pennington for neuro-ophthalmology evaluation. Dr. Pennington' evaluation was notable for left eye disc edema and an enlarged physiologic blind spot suggestive for extrinsic optic nerve compression.  MRI brain and orbits performed at [facility] 4/30/24 demonstrating a suspected meningioma of the left optic nerve sheath as well as a possible 1.1cm aneurysm of the left supra clinoid internal carotid artery. Findings: Brain  Cerebral ventricles are normal in size and position. Few small T2/flair signal hyperintensities in the supratentorial white matter suggesting minimal age-appropriate chronic microvascular ischemic changes. Diffusion-weighted images demonstrate no evidence of acute infarction. There is no evidence of intracranial hemorrhage, midline shift or mass effect. Postcontrast images demonstrate no enhancing lesions.  Signal void in the left side of the suprasellar cistern measures approximately 1.1 cm and suspicious for large supraclinoid ICA aneurysm. It abuts the left side of the optic chiasm.  There is no evidence of significant sinusitis. Small left maxillary sinus retention cyst versus polyp.  MRI ORBITS IMPRESSION: Diffuse left optic nerve sheath thickening and enhancement and nodular enhancement in the left amie-clinoid region adjacent to the intracranial optic nerve, suspicious for large optic nerve sheath meningioma. Dr. Pennington notified by Tiger text. Optic perineuritis considered less likely. Optic nerve glioma unlikely as the lesion does not appear to originate from the nerve. However, the possibility of left optic nerve edema cannot be excluded.  Angiogram done 5.15.24 showed [id number] multilobulated dysplastic wide necked left Opthalmic internal carotid artery aneurysm incorporating origin of aberrant proximal left fetal posterior cerebral artery 2.5x3mm left Opthalmic segment carotid aneurysm arising just distal to the larger more proximal aneurysm.  5/16/24 Dr Fung has referred her here to speak about evaluation and possible treatment with stereotactic fractionated radiotherapy.  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 She returns today for her initial post treatment evaluation. Patient denies any headache, dizziness, or gait imbalance. She does report of occasional anomic aphasia and stable left eye blurriness. Patient is currently experiencing Sjogren's flare with skin rash, irritation, and blotches for which she has been using Amlactin; managed by Dr. Good (rheumatologist).  11/15/24 Six month follow up Ms Gar had a MRI at [facility] on 10/29/24 which showed: Left optic nerve sheath meningioma not significantly changed compared to prior orbital MRI on 4/30/24. She had presented to [place] ER on 10/5/24 with balance issues, favoring the right side, for approximately 24 hours. MRI brain demonstrated no overt evidence of acute ischemic event, though there was suggestion of diffusion restriction in region of prior infarct. Evaluation by neurology with EEG demonstrated occasional left temporal slowing suggestive of focal dysfunction in this region. Her ASA and P2Y12 assays were noted to be therapeutic at that time on ASA 81 and Brillinta 60 mg BID dosing. She was discharged on 10/7/24 in stable condition but had returned to the ER that evening after an episode of right facial weakness, right vision loss, and mild right arm and leg weakness. Her symptoms resolved and she returned to baseline en route via EMS. MRI demonstrated punctate acute ischemic infarction in the posterior limb of the left internal capsule. CTA showed no large vessel occlusion or stenosis. Repeat ASA assay demonstrated subtherapeutic effect, while P2Y12 assay remained therapeutic. Her aspirin was increased to 325 mg daily as a result of this, and atorvastatin was increased to 80 mg daily following consultation with vascular neurology. She underwent a diagnostic cerebral angiogram on 10/9/24. IMPRESSION:  1. 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 Marked reduction of filling of the aneurysm, with small ~4 mm residual along the proximal portion of a patent posterior communicating artery. Short segment occlusion of the proximal anterior choroidal artery with collateral slower filling of the distal segments, corresponding with new infarct in the posterior limb of the internal capsule seen on the latest MRI.   She has seen Shana Justin on 10/22/24 for longitudinal management regarding secondary stroke prevention. She saw Dr Fung on 10/25/24 He will increase ASA dose to 235mg brilinta 60mg bid and atorvastatin 80mg QD. diagnostic angiography to be performed in April 2025 to assess aneurysm treatment efficacy at that time.  Ms. Gar states that she is overall feeling well. She denies any pain. She does get the occasional headaches, but an aspirin does help. She still has some cloudy vision in the morning. She believes that her speech is about the same.  4/24/2025 Ms. Gar presents today for a follow up.  2/21/2025 MRIB ([facility]) revealed a complex exam. Findings consistent with grossly stable left optic nerve sheath meningioma with intraorbital and intracranial involvement. Abnormal appearance of the left internal carotid artery in patient with previously stented aneurysm.  She had a follow up with Dr. Fung on 3/10/2025. He recommends a left internal carotid artery diagnostic angiography with conscious sedation in the near future. At that time, we will determine the appropriateness of discontinuation of Brilinta and dosage adjustment of ASA to 81mg po QD. She remains on  and Brilinta 60 mg BID. She has seen Dr. Meghan Cordova who has not made changes to her medication at this time and will follow longitudinally for secondary stroke prevention. Her atorvastatin 80 mg was previously decreased to 40 mg due to hepatic dysfunction by report.   Dupixent Pregnancy And Lactation Text: This medication likely crosses the placenta but the risk for the fetus is uncertain. This medication is excreted in breast milk.

## 2025-04-15 NOTE — DISEASE MANAGEMENT
[Clinical] : TNM Stage: c [TTNM] : x [NTNM] : x [MTNM] : x [N/A] : Currently not applicable [de-identified] : 2468 [de-identified] : 5551 [de-identified] : meningioma

## 2025-04-17 NOTE — REASON FOR VISIT
[FreeTextEntry1] : SAMMY JUNG is a 63 year female seen in the office today for results review of a diagnostic cerebral angiogram performed independently and reviewed by me on 4/2/25.  She is on aspirin 81 mg and has stopped the Brilinta at our instruction.  She had contacted the office on 4/11/25 and reported that 2 hours prior had an episode where she experienced numbness in her right 5th digit with some weakness on right and some blurred vision where she lost her balance.  By the time she called she was 90% at baseline and did not seek any further evaluation.  She has not any episodes of this since and has been feeling well. She last saw Dr. Cordova on 3/27/25 and will follow up with her on 5/6/25. The patient denies new neurological symptoms.

## 2025-04-17 NOTE — DATA REVIEWED
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 Mild intimal hyperplasia without flow limiting stenosis noted. Anterograde filling of left anterior choroidal artery noted.

## 2025-04-17 NOTE — ASSESSMENT
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    I am extremely pleased with these results and with Ms Jung's recovery. I have recommended MRA brain without gadolinium in one year with an appointment to follow. Ms Jung feels that she has executive function difficulties, specifically with multitasking and complex memory encoding. As such, we are providing a referral to Dr. Rama Van for a comprehensive neuropsychological assessment to inform cognitive remediation strategies.  I recommend that she continue close follow up with Dr. Pennington and Dr. Meghan Cordova. She also has follow up with Dr. Marlow next week to discuss ongoing management of her left optic nerve sheath meningioma.   I have asked the patient to contact me for any symptomatic development or progression in the interim at which time we can obtain expedited follow up imaging.   A total of 45 minutes was spent relative to this encounter.

## 2025-04-17 NOTE — DATA REVIEWED
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 Mild intimal hyperplasia without flow limiting stenosis noted. Anterograde filling of left anterior choroidal artery noted.

## 2025-04-17 NOTE — ASSESSMENT
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    I am extremely pleased with these results and with Ms Jung's recovery. I have recommended MRA brain without gadolinium in one year with an appointment to follow. Ms Jung feels that she has executive function difficulties, specifically with multitasking and complex memory encoding. As such, we are providing a referral to Dr. Rama Van for a comprehensive neuropsychological assessment to inform cognitive remediation strategies.  I recommend that she continue close follow up with Dr. Pennington and Dr. Meghan Cordova. She also has follow up with Dr. Marlow next week to discuss ongoing management of her left optic nerve sheath meningioma.   I have asked the patient to contact me for any symptomatic development or progression in the interim at which time we can obtain expedited follow up imaging.   A total of 45 minutes was spent relative to this encounter.

## 2025-04-24 NOTE — HISTORY OF PRESENT ILLNESS
[FreeTextEntry1] : Ms Gar is a 63-year-old female PMHx of Sjogren's disease, pre-diabetes who presented to Dr Pennington and [place] with visual disturbances of the LEFT eye. 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  Describes cloudy vision in the inferior visual field of her left eye. Associated blurry vision upon awakening, which improves throughout the day. Associated photosensitivity as well. Reports significant change in her eyeglasses prescription this year as well, notable for her left vision worse than her right. She has followed with an ophthalmologist for several years. Underwent routine evaluation in March 2024, demonstrating edema of the LEFT optic disc. She was subsequently referred to Dr. Pennington for neuro-ophthalmology evaluation. Dr. Pennington' evaluation was notable for left eye disc edema and an enlarged physiologic blind spot suggestive for extrinsic optic nerve compression.  MRI brain and orbits performed at [facility] 4/30/24 demonstrating a suspected meningioma of the left optic nerve sheath as well as a possible 1.1cm aneurysm of the left supra clinoid internal carotid artery. Findings: Brain  Cerebral ventricles are normal in size and position. Few small T2/flair signal hyperintensities in the supratentorial white matter suggesting minimal age-appropriate chronic microvascular ischemic changes. Diffusion-weighted images demonstrate no evidence of acute infarction. There is no evidence of intracranial hemorrhage, midline shift or mass effect. Postcontrast images demonstrate no enhancing lesions.  Signal void in the left side of the suprasellar cistern measures approximately 1.1 cm and suspicious for large supraclinoid ICA aneurysm. It abuts the left side of the optic chiasm.  There is no evidence of significant sinusitis. Small left maxillary sinus retention cyst versus polyp.  MRI ORBITS IMPRESSION: Diffuse left optic nerve sheath thickening and enhancement and nodular enhancement in the left amie-clinoid region adjacent to the intracranial optic nerve, suspicious for large optic nerve sheath meningioma. Dr. Pennington notified by Tiger text. Optic perineuritis considered less likely. Optic nerve glioma unlikely as the lesion does not appear to originate from the nerve. However, the possibility of left optic nerve edema cannot be excluded.  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  5/16/24 Dr Fung has referred her here to speak about evaluation and possible treatment with stereotactic fractionated radiotherapy.  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 She returns today for her initial post treatment evaluation. Patient denies any headache, dizziness, or gait imbalance. She does report of occasional anomic aphasia and stable left eye blurriness. Patient is currently experiencing Sjogren's flare with skin rash, irritation, and blotches for which she has been using Amlactin; managed by Dr. Good (rheumatologist).  11/15/24 Six month follow up Ms Gar had a MRI at [facility] on 10/29/24 which showed: Left optic nerve sheath meningioma not significantly changed compared to prior orbital MRI on 4/30/24. She had presented to [facility] on 10/5/24 with balance issues, favoring the right side, for approximately 24 hours. MRI brain demonstrated no overt evidence of acute ischemic event, though there was suggestion of diffusion restriction in region of prior infarct. Evaluation by neurology with EEG demonstrated occasional left temporal slowing suggestive of focal dysfunction in this region. Her ASA and P2Y12 assays were noted to be therapeutic at that time on ASA 81 and Brillinta 60 mg BID dosing. She was discharged on 10/7/24 in stable condition but had returned to the ER that evening after an episode of right facial weakness, right vision loss, and mild right arm and leg weakness. Her symptoms resolved and she returned to baseline en route via EMS. MRI demonstrated punctate acute ischemic infarction in the posterior limb of the left internal capsule. CTA showed no large vessel occlusion or stenosis. Repeat ASA assay demonstrated subtherapeutic effect, while P2Y12 assay remained therapeutic. Her aspirin was increased to 325 mg daily as a result of this, and atorvastatin was increased to 80 mg daily following consultation with vascular neurology. She underwent a diagnostic cerebral angiogram on 10/9/24. IMPRESSION:  1. 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 Marked reduction of filling of the aneurysm, with small ~4 mm residual along the proximal portion of a patent posterior communicating artery. Short segment occlusion of the proximal anterior choroidal artery with collateral slower filling of the distal segments, corresponding with new infarct in the posterior limb of the internal capsule seen on the latest MRI.   She has seen Shana Justin on 10/22/24 for longitudinal management regarding secondary stroke prevention. She saw Dr Fung on 10/25/24 He will increase ASA dose to 235mg brilinta 60mg bid and atorvastatin 80mg QD. diagnostic angiography to be performed in April 2025 to assess aneurysm treatment efficacy at that time.  Ms. Gar states that she is overall feeling well. She denies any pain. She does get the occasional headaches, but an aspirin does help. She still has some cloudy vision in the morning. She believes that her speech is about the same.  4/24/2025 Ms. Gar presents today for a follow up.  2/21/2025 MRIB ([facility]) revealed a complex exam. Findings consistent with grossly stable left optic nerve sheath meningioma with intraorbital and intracranial involvement. Abnormal appearance of the left internal carotid artery in patient with previously stented aneurysm.  4/2/2025 Angiogram revealed no evidence for residual or recurrent left ophthalmic segment internal carotid artery aneurysm status post endovascular pipeline embolization. Mild intimal hyperplasia without flow limiting stenosis noted. 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  She had a follow up with Dr. Fung on 4/17/2025. She states that she still has some headaches, but they are much improved. Her vision is fine in the right eye, but the left eye is still cloudy. She is using eye drops daily. She has an appointment with Dr. Pennington the neuro-ophthalmologist in 6/2025.

## 2025-04-24 NOTE — REVIEW OF SYSTEMS
[Fatigue: Grade 1 - Fatigue relieved by rest] : Fatigue: Grade 1 - Fatigue relieved by rest [Blurred Vision: Grade 1 - Intervention not indicated] : Blurred Vision: Grade 1 - Intervention not indicated [de-identified] : left eye [Headache: Grade 1 - Mild pain] : Headache: Grade 1 - Mild pain [FreeTextEntry6] : improving

## 2025-04-24 NOTE — DISEASE MANAGEMENT
[TTNM] : x [NTNM] : x [MTNM] : x [de-identified] : 1049 [de-identified] : 0823 [de-identified] : meningioma

## 2025-04-24 NOTE — PHYSICAL EXAM
[No Focal Deficits] : no focal deficits [Sensation] : the sensory exam was normal to light touch and pinprick [Motor Exam] : the motor exam was normal [Normal] : oriented to person, place and time, the affect was normal, the mood was normal and not anxious [de-identified] : No cranial nerve deficits noted.  No cerebellar signs

## 2025-04-24 NOTE — REVIEW OF SYSTEMS
[Fatigue: Grade 1 - Fatigue relieved by rest] : Fatigue: Grade 1 - Fatigue relieved by rest [Blurred Vision: Grade 1 - Intervention not indicated] : Blurred Vision: Grade 1 - Intervention not indicated [de-identified] : left eye [Headache: Grade 1 - Mild pain] : Headache: Grade 1 - Mild pain [FreeTextEntry6] : improving

## 2025-04-24 NOTE — HISTORY OF PRESENT ILLNESS
[FreeTextEntry1] : Ms Gar is a 63-year-old female PMHx of Sjogren's disease, pre-diabetes who presented to Dr Pennington and [place] with visual disturbances of the LEFT eye. 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  Describes cloudy vision in the inferior visual field of her left eye. Associated blurry vision upon awakening, which improves throughout the day. Associated photosensitivity as well. Reports significant change in her eyeglasses prescription this year as well, notable for her left vision worse than her right. She has followed with an ophthalmologist for several years. Underwent routine evaluation in March 2024, demonstrating edema of the LEFT optic disc. She was subsequently referred to Dr. Pennington for neuro-ophthalmology evaluation. Dr. Pennington' evaluation was notable for left eye disc edema and an enlarged physiologic blind spot suggestive for extrinsic optic nerve compression.  MRI brain and orbits performed at [facility] 4/30/24 demonstrating a suspected meningioma of the left optic nerve sheath as well as a possible 1.1cm aneurysm of the left supra clinoid internal carotid artery. Findings: Brain  Cerebral ventricles are normal in size and position. Few small T2/flair signal hyperintensities in the supratentorial white matter suggesting minimal age-appropriate chronic microvascular ischemic changes. Diffusion-weighted images demonstrate no evidence of acute infarction. There is no evidence of intracranial hemorrhage, midline shift or mass effect. Postcontrast images demonstrate no enhancing lesions.  Signal void in the left side of the suprasellar cistern measures approximately 1.1 cm and suspicious for large supraclinoid ICA aneurysm. It abuts the left side of the optic chiasm.  There is no evidence of significant sinusitis. Small left maxillary sinus retention cyst versus polyp.  MRI ORBITS IMPRESSION: Diffuse left optic nerve sheath thickening and enhancement and nodular enhancement in the left amie-clinoid region adjacent to the intracranial optic nerve, suspicious for large optic nerve sheath meningioma. Dr. Pennington notified by Tiger text. Optic perineuritis considered less likely. Optic nerve glioma unlikely as the lesion does not appear to originate from the nerve. However, the possibility of left optic nerve edema cannot be excluded.  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  5/16/24 Dr Fung has referred her here to speak about evaluation and possible treatment with stereotactic fractionated radiotherapy.  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 She returns today for her initial post treatment evaluation. Patient denies any headache, dizziness, or gait imbalance. She does report of occasional anomic aphasia and stable left eye blurriness. Patient is currently experiencing Sjogren's flare with skin rash, irritation, and blotches for which she has been using Amlactin; managed by Dr. Good (rheumatologist).  11/15/24 Six month follow up Ms Gar had a MRI at [facility] on 10/29/24 which showed: Left optic nerve sheath meningioma not significantly changed compared to prior orbital MRI on 4/30/24. She had presented to [facility] on 10/5/24 with balance issues, favoring the right side, for approximately 24 hours. MRI brain demonstrated no overt evidence of acute ischemic event, though there was suggestion of diffusion restriction in region of prior infarct. Evaluation by neurology with EEG demonstrated occasional left temporal slowing suggestive of focal dysfunction in this region. Her ASA and P2Y12 assays were noted to be therapeutic at that time on ASA 81 and Brillinta 60 mg BID dosing. She was discharged on 10/7/24 in stable condition but had returned to the ER that evening after an episode of right facial weakness, right vision loss, and mild right arm and leg weakness. Her symptoms resolved and she returned to baseline en route via EMS. MRI demonstrated punctate acute ischemic infarction in the posterior limb of the left internal capsule. CTA showed no large vessel occlusion or stenosis. Repeat ASA assay demonstrated subtherapeutic effect, while P2Y12 assay remained therapeutic. Her aspirin was increased to 325 mg daily as a result of this, and atorvastatin was increased to 80 mg daily following consultation with vascular neurology. She underwent a diagnostic cerebral angiogram on 10/9/24. IMPRESSION:  1. 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 Marked reduction of filling of the aneurysm, with small ~4 mm residual along the proximal portion of a patent posterior communicating artery. Short segment occlusion of the proximal anterior choroidal artery with collateral slower filling of the distal segments, corresponding with new infarct in the posterior limb of the internal capsule seen on the latest MRI.   She has seen Shana Justin on 10/22/24 for longitudinal management regarding secondary stroke prevention. She saw Dr Fung on 10/25/24 He will increase ASA dose to 235mg brilinta 60mg bid and atorvastatin 80mg QD. diagnostic angiography to be performed in April 2025 to assess aneurysm treatment efficacy at that time.  Ms. Gar states that she is overall feeling well. She denies any pain. She does get the occasional headaches, but an aspirin does help. She still has some cloudy vision in the morning. She believes that her speech is about the same.  4/24/2025 Ms. Gar presents today for a follow up.  2/21/2025 MRIB ([facility]) revealed a complex exam. Findings consistent with grossly stable left optic nerve sheath meningioma with intraorbital and intracranial involvement. Abnormal appearance of the left internal carotid artery in patient with previously stented aneurysm.  4/2/2025 Angiogram revealed no evidence for residual or recurrent left ophthalmic segment internal carotid artery aneurysm status post endovascular pipeline embolization. Mild intimal hyperplasia without flow limiting stenosis noted. 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  She had a follow up with Dr. Fung on 4/17/2025. She states that she still has some headaches, but they are much improved. Her vision is fine in the right eye, but the left eye is still cloudy. She is using eye drops daily. She has an appointment with Dr. Pennington the neuro-ophthalmologist in 6/2025.

## 2025-04-24 NOTE — PHYSICAL EXAM
[No Focal Deficits] : no focal deficits [Sensation] : the sensory exam was normal to light touch and pinprick [Motor Exam] : the motor exam was normal [Normal] : oriented to person, place and time, the affect was normal, the mood was normal and not anxious [de-identified] : No cranial nerve deficits noted.  No cerebellar signs

## 2025-04-24 NOTE — DISEASE MANAGEMENT
[TTNM] : x [NTNM] : x [MTNM] : x [de-identified] : 1864 [de-identified] : 4543 [de-identified] : meningioma